# Patient Record
Sex: FEMALE | Race: WHITE | Employment: FULL TIME | URBAN - METROPOLITAN AREA
[De-identification: names, ages, dates, MRNs, and addresses within clinical notes are randomized per-mention and may not be internally consistent; named-entity substitution may affect disease eponyms.]

---

## 2017-08-02 ENCOUNTER — OFFICE VISIT (OUTPATIENT)
Dept: OBGYN CLINIC | Age: 52
End: 2017-08-02

## 2017-08-02 VITALS
SYSTOLIC BLOOD PRESSURE: 142 MMHG | HEIGHT: 67 IN | DIASTOLIC BLOOD PRESSURE: 94 MMHG | RESPIRATION RATE: 19 BRPM | HEART RATE: 75 BPM | BODY MASS INDEX: 29.35 KG/M2 | WEIGHT: 187 LBS

## 2017-08-02 DIAGNOSIS — Z11.51 SPECIAL SCREENING EXAMINATION FOR HUMAN PAPILLOMAVIRUS (HPV): ICD-10-CM

## 2017-08-02 DIAGNOSIS — Z01.419 WELL FEMALE EXAM WITH ROUTINE GYNECOLOGICAL EXAM: Primary | ICD-10-CM

## 2017-08-02 NOTE — PATIENT INSTRUCTIONS

## 2017-08-04 LAB
CYTOLOGIST CVX/VAG CYTO: NORMAL
CYTOLOGY CVX/VAG DOC THIN PREP: NORMAL
CYTOLOGY HISTORY:: NORMAL
DX ICD CODE: NORMAL
HPV I/H RISK 1 DNA CVX QL PROBE+SIG AMP: NEGATIVE
Lab: NORMAL
OTHER STN SPEC: NORMAL
PATH REPORT.FINAL DX SPEC: NORMAL
STAT OF ADQ CVX/VAG CYTO-IMP: NORMAL

## 2018-03-14 ENCOUNTER — TELEPHONE (OUTPATIENT)
Dept: OBGYN CLINIC | Age: 53
End: 2018-03-14

## 2018-03-14 DIAGNOSIS — Z12.39 SCREENING FOR BREAST CANCER: Primary | ICD-10-CM

## 2018-03-14 NOTE — TELEPHONE ENCOUNTER
Patient needed a mammogram order placed for the [facility] imagining dept since our mammogram machine is down. Orders placed and patient given central scheduling number.

## 2018-03-28 ENCOUNTER — OFFICE VISIT (OUTPATIENT)
Dept: OBGYN CLINIC | Age: 53
End: 2018-03-28

## 2018-03-28 ENCOUNTER — HOSPITAL ENCOUNTER (OUTPATIENT)
Dept: MAMMOGRAPHY | Age: 53
Discharge: HOME | End: 2018-03-28
Attending: OBSTETRICS & GYNECOLOGY
Payer: COMMERCIAL

## 2018-03-28 VITALS
WEIGHT: 191 LBS | BODY MASS INDEX: 29.98 KG/M2 | RESPIRATION RATE: 19 BRPM | HEIGHT: 67 IN | DIASTOLIC BLOOD PRESSURE: 87 MMHG | SYSTOLIC BLOOD PRESSURE: 137 MMHG | HEART RATE: 98 BPM

## 2018-03-28 DIAGNOSIS — Z01.419 WELL FEMALE EXAM WITH ROUTINE GYNECOLOGICAL EXAM: Primary | ICD-10-CM

## 2018-03-28 DIAGNOSIS — Z12.39 SCREENING BREAST EXAMINATION: ICD-10-CM

## 2018-03-28 PROCEDURE — 77063 BREAST TOMOSYNTHESIS BI: CPT

## 2018-03-28 NOTE — PROGRESS NOTES
Kelly Gross is a [de-identified] ,  50 y.o. female [facility] whose No LMP recorded. Patient has had a hysterectomy. was on  who presents for her annual checkup. She is having no significant problems. Hormone Status:    She is not having vasomotor symptoms. The patient is using no HRT. Sexual history:    She  reports that she currently engages in sexual activity. Medical conditions:    Since her last annual GYN exam about 6 months ago, she has had the following changes in her health history: none. Pap and Mammogram History:    Her most recent Pap smear was Negative and HPV negative obtained 6 months ago. The patient has a mammogram today. Breast Cancer History/Substance Abuse:    She has no family history of breast cancer. Osteoporosis History:    Family history does not include a first or second degree relative with osteopenia or osteoporosis. A bone density scan has not been previously obtained. Past Medical History:   Diagnosis Date    Hx of abnormal Pap smear 2012    HPV Positive     Past Surgical History:   Procedure Laterality Date    HX DILATION AND CURETTAGE      HX HYSTEROSCOPY      HX LAPAROSCOPIC SUPRACERVICAL HYSTERECTOMY  2010    HX MYOMECTOMY       Tobacco History:  reports that she has never smoked. She has never used smokeless tobacco.  Alcohol Abuse:  reports that she drinks alcohol. Drug Abuse:  reports that she does not use illicit drugs. Current Outpatient Prescriptions   Medication Sig Dispense Refill    naproxen sodium (ALEVE) 220 mg tablet Take 220 mg by mouth two (2) times daily (with meals). Allergies: Pcn [penicillins]   Social History     Social History    Marital status: SINGLE     Spouse name: N/A    Number of children: N/A    Years of education: N/A     Occupational History    Not on file.      Social History Main Topics    Smoking status: Never Smoker    Smokeless tobacco: Never Used    Alcohol use Yes      Comment: occ    Drug use: No    Sexual activity: Yes     Other Topics Concern    Not on file     Social History Narrative     There is no problem list on file for this patient.       Review of Systems - History obtained from the patient  Constitutional: negative for weight loss, fever, night sweats  HEENT: negative for hearing loss, earache, congestion, snoring, sorethroat  CV: negative for chest pain, palpitations, edema  Resp: negative for cough, shortness of breath, wheezing  GI: negative for change in bowel habits, abdominal pain, black or bloody stools  : negative for frequency, dysuria, hematuria, vaginal discharge  MSK: negative for back pain, joint pain, muscle pain  Breast: negative for breast lumps, nipple discharge, galactorrhea  Skin :negative for itching, rash, hives  Neuro: negative for dizziness, headache, confusion, weakness  Psych: negative for anxiety, depression, change in mood  Heme/lymph: negative for bleeding, bruising, pallor    Physical Exam    Visit Vitals    /87 (BP 1 Location: Left arm, BP Patient Position: Sitting)    Pulse 98    Resp 19    Ht 5' 7\" (1.702 m)    Wt 191 lb (86.6 kg)    BMI 29.91 kg/m2     Constitutional  · Appearance: well-nourished, well developed, alert, in no acute distress    HENT  · Head and Face: appears normal    Neck  · Inspection/Palpation: normal appearance, no masses or tenderness  Lymph Nodes: no lymphadenopathy present    Chest  · Respiratory Effort: breathing normal    Breasts  · Inspection of Breasts: breasts symmetrical, no skin changes, no discharge present, nipple appearance normal, no skin retraction present  · Palpation of Breasts and Axillae: no masses present on palpation, no breast tenderness  · Axillary Lymph Nodes: no lymphadenopathy present    Gastrointestinal  · Abdominal Examination: abdomen non-tender to palpation, normal bowel sounds, no masses present  · Liver and spleen: no hepatomegaly present, spleen not palpable  · Hernias: no hernias identified    Genitourinary  · External Genitalia: normal appearance for age, no discharge present, no tenderness present, no inflammatory lesions present, no masses present, no atrophy present  · Vagina: normal vaginal vault without central or paravaginal defects, no discharge present, no inflammatory lesions present, no masses present  · Bladder: non-tender to palpation  · Urethra: appears normal  · Cervix:normal  · Uterus: absent  · Adnexa: no adnexal tenderness present, no adnexal masses present  · Perineum: perineum within normal limits, no evidence of trauma, no rashes or skin lesions present  · Anus: anus within normal limits, no hemorrhoids present  · Inguinal Lymph Nodes: no lymphadenopathy present      Skin  · General Inspection: no rash, no lesions identified    Neurologic/Psychiatric  · Mental Status:  · Orientation: grossly oriented to person, place and time  · Mood and Affect: mood normal, affect appropriate    . Assessment:  Routine gynecologic examination  Her current medical status is satisfactory with no evidence of significant gynecologic issues.     Plan:  Counseled re: diet, exercise, healthy lifestyle  Return for yearly wellness visits  Rec annual mammogram  Patient Verbalized understanding

## 2018-07-16 ENCOUNTER — HOSPITAL ENCOUNTER (EMERGENCY)
Age: 53
Discharge: HOME | End: 2018-07-16
Attending: EMERGENCY MEDICINE | Admitting: EMERGENCY MEDICINE
Payer: COMMERCIAL

## 2018-07-16 ENCOUNTER — APPOINTMENT (OUTPATIENT)
Dept: CT IMAGING | Age: 53
End: 2018-07-16
Attending: PHYSICIAN ASSISTANT
Payer: COMMERCIAL

## 2018-07-16 VITALS
HEART RATE: 67 BPM | WEIGHT: 183 LBS | DIASTOLIC BLOOD PRESSURE: 89 MMHG | OXYGEN SATURATION: 97 % | HEIGHT: 67 IN | BODY MASS INDEX: 28.72 KG/M2 | RESPIRATION RATE: 18 BRPM | SYSTOLIC BLOOD PRESSURE: 132 MMHG | TEMPERATURE: 98.3 F

## 2018-07-16 DIAGNOSIS — R10.9 RIGHT FLANK PAIN: Primary | ICD-10-CM

## 2018-07-16 DIAGNOSIS — E86.0 DEHYDRATION: ICD-10-CM

## 2018-07-16 DIAGNOSIS — N12 PYELONEPHRITIS: ICD-10-CM

## 2018-07-16 LAB
ALBUMIN SERPL-MCNC: 3.6 G/DL (ref 3.5–5)
ALBUMIN/GLOB SERPL: 0.9 {RATIO} (ref 1.1–2.2)
ALP SERPL-CCNC: 100 U/L (ref 45–117)
ALT SERPL-CCNC: 35 U/L (ref 12–78)
ANION GAP SERPL CALC-SCNC: 11 MMOL/L (ref 5–15)
APPEARANCE UR: CLEAR
AST SERPL-CCNC: 26 U/L (ref 15–37)
BACTERIA URNS QL MICRO: ABNORMAL /HPF
BASOPHILS # BLD: 0.1 K/UL (ref 0–0.1)
BASOPHILS NFR BLD: 1 % (ref 0–1)
BILIRUB SERPL-MCNC: 0.4 MG/DL (ref 0.2–1)
BILIRUB UR QL: NEGATIVE
BUN SERPL-MCNC: 22 MG/DL (ref 6–20)
BUN/CREAT SERPL: 27 (ref 12–20)
CALCIUM SERPL-MCNC: 9.4 MG/DL (ref 8.5–10.1)
CHLORIDE SERPL-SCNC: 103 MMOL/L (ref 97–108)
CO2 SERPL-SCNC: 24 MMOL/L (ref 21–32)
COLOR UR: ABNORMAL
CREAT SERPL-MCNC: 0.81 MG/DL (ref 0.55–1.02)
DIFFERENTIAL METHOD BLD: NORMAL
EOSINOPHIL # BLD: 0.2 K/UL (ref 0–0.4)
EOSINOPHIL NFR BLD: 3 % (ref 0–7)
EPITH CASTS URNS QL MICRO: ABNORMAL /LPF
ERYTHROCYTE [DISTWIDTH] IN BLOOD BY AUTOMATED COUNT: 12.8 % (ref 11.5–14.5)
GLOBULIN SER CALC-MCNC: 4.1 G/DL (ref 2–4)
GLUCOSE SERPL-MCNC: 118 MG/DL (ref 65–100)
GLUCOSE UR STRIP.AUTO-MCNC: NEGATIVE MG/DL
HCT VFR BLD AUTO: 43.6 % (ref 35–47)
HGB BLD-MCNC: 14.6 G/DL (ref 11.5–16)
HGB UR QL STRIP: ABNORMAL
HYALINE CASTS URNS QL MICRO: ABNORMAL /LPF (ref 0–5)
IMM GRANULOCYTES # BLD: 0 K/UL (ref 0–0.04)
IMM GRANULOCYTES NFR BLD AUTO: 0 % (ref 0–0.5)
KETONES UR QL STRIP.AUTO: NEGATIVE MG/DL
LEUKOCYTE ESTERASE UR QL STRIP.AUTO: NEGATIVE
LYMPHOCYTES # BLD: 2.2 K/UL (ref 0.8–3.5)
LYMPHOCYTES NFR BLD: 27 % (ref 12–49)
MCH RBC QN AUTO: 29.9 PG (ref 26–34)
MCHC RBC AUTO-ENTMCNC: 33.5 G/DL (ref 30–36.5)
MCV RBC AUTO: 89.2 FL (ref 80–99)
MONOCYTES # BLD: 0.5 K/UL (ref 0–1)
MONOCYTES NFR BLD: 6 % (ref 5–13)
NEUTS SEG # BLD: 5.3 K/UL (ref 1.8–8)
NEUTS SEG NFR BLD: 64 % (ref 32–75)
NITRITE UR QL STRIP.AUTO: NEGATIVE
NRBC # BLD: 0 K/UL (ref 0–0.01)
NRBC BLD-RTO: 0 PER 100 WBC
PH UR STRIP: 5.5 [PH] (ref 5–8)
PLATELET # BLD AUTO: 330 K/UL (ref 150–400)
PMV BLD AUTO: 10.5 FL (ref 8.9–12.9)
POTASSIUM SERPL-SCNC: 4.1 MMOL/L (ref 3.5–5.1)
PROT SERPL-MCNC: 7.7 G/DL (ref 6.4–8.2)
PROT UR STRIP-MCNC: 100 MG/DL
RBC # BLD AUTO: 4.89 M/UL (ref 3.8–5.2)
RBC #/AREA URNS HPF: ABNORMAL /HPF (ref 0–5)
SODIUM SERPL-SCNC: 138 MMOL/L (ref 136–145)
SP GR UR REFRACTOMETRY: >1.03 (ref 1–1.03)
UR CULT HOLD, URHOLD: NORMAL
UROBILINOGEN UR QL STRIP.AUTO: 0.2 EU/DL (ref 0.2–1)
WBC # BLD AUTO: 8.3 K/UL (ref 3.6–11)
WBC URNS QL MICRO: ABNORMAL /HPF (ref 0–4)

## 2018-07-16 PROCEDURE — 99284 EMERGENCY DEPT VISIT MOD MDM: CPT

## 2018-07-16 PROCEDURE — 96375 TX/PRO/DX INJ NEW DRUG ADDON: CPT

## 2018-07-16 PROCEDURE — 96361 HYDRATE IV INFUSION ADD-ON: CPT

## 2018-07-16 PROCEDURE — 81001 URINALYSIS AUTO W/SCOPE: CPT | Performed by: EMERGENCY MEDICINE

## 2018-07-16 PROCEDURE — 96374 THER/PROPH/DIAG INJ IV PUSH: CPT

## 2018-07-16 PROCEDURE — 80053 COMPREHEN METABOLIC PANEL: CPT | Performed by: PHYSICIAN ASSISTANT

## 2018-07-16 PROCEDURE — 74176 CT ABD & PELVIS W/O CONTRAST: CPT

## 2018-07-16 PROCEDURE — 87086 URINE CULTURE/COLONY COUNT: CPT | Performed by: PHYSICIAN ASSISTANT

## 2018-07-16 PROCEDURE — 36415 COLL VENOUS BLD VENIPUNCTURE: CPT | Performed by: PHYSICIAN ASSISTANT

## 2018-07-16 PROCEDURE — 85025 COMPLETE CBC W/AUTO DIFF WBC: CPT | Performed by: PHYSICIAN ASSISTANT

## 2018-07-16 PROCEDURE — 74011250636 HC RX REV CODE- 250/636: Performed by: PHYSICIAN ASSISTANT

## 2018-07-16 RX ORDER — CEPHALEXIN 500 MG/1
500 CAPSULE ORAL 3 TIMES DAILY
Qty: 30 CAP | Refills: 0 | Status: SHIPPED | OUTPATIENT
Start: 2018-07-16 | End: 2018-07-26

## 2018-07-16 RX ORDER — ONDANSETRON 4 MG/1
4 TABLET, ORALLY DISINTEGRATING ORAL
Qty: 10 TAB | Refills: 0 | Status: SHIPPED | OUTPATIENT
Start: 2018-07-16 | End: 2019-03-05

## 2018-07-16 RX ORDER — ONDANSETRON 2 MG/ML
4 INJECTION INTRAMUSCULAR; INTRAVENOUS
Status: COMPLETED | OUTPATIENT
Start: 2018-07-16 | End: 2018-07-16

## 2018-07-16 RX ORDER — TRAMADOL HYDROCHLORIDE 50 MG/1
50 TABLET ORAL
Qty: 10 TAB | Refills: 0 | Status: SHIPPED | OUTPATIENT
Start: 2018-07-16 | End: 2019-03-05

## 2018-07-16 RX ORDER — KETOROLAC TROMETHAMINE 10 MG/1
10 TABLET, FILM COATED ORAL
Qty: 10 TAB | Refills: 0 | Status: SHIPPED | OUTPATIENT
Start: 2018-07-16 | End: 2019-03-05

## 2018-07-16 RX ORDER — KETOROLAC TROMETHAMINE 30 MG/ML
30 INJECTION, SOLUTION INTRAMUSCULAR; INTRAVENOUS
Status: COMPLETED | OUTPATIENT
Start: 2018-07-16 | End: 2018-07-16

## 2018-07-16 RX ADMIN — ONDANSETRON 4 MG: 2 INJECTION, SOLUTION INTRAMUSCULAR; INTRAVENOUS at 10:42

## 2018-07-16 RX ADMIN — SODIUM CHLORIDE 1000 ML: 900 INJECTION, SOLUTION INTRAVENOUS at 12:37

## 2018-07-16 RX ADMIN — KETOROLAC TROMETHAMINE 30 MG: 30 INJECTION, SOLUTION INTRAMUSCULAR; INTRAVENOUS at 10:44

## 2018-07-16 RX ADMIN — SODIUM CHLORIDE 1000 ML: 900 INJECTION, SOLUTION INTRAVENOUS at 10:40

## 2018-07-16 NOTE — ED PROVIDER NOTES
HPI Comments: Ivette Urias is a 50 y.o. female who presents ambulatory to the ED with a c/o right low back// flank pain since awakening yesterday am. Pt notes she has taken aleve yesterday without relief of her sx. She states she does not feel as though she is urinating as much as usual. She also reports constipation. Pt denies dysuria, hematuria, vomiting and diarrhea. She states she feels a little nauseated and thinks she has some right sided abd pressure. Pt denies f/c, cp,sob or blood in her stool. Pt denies injury. She denies a hx of back problems or kidney stones. She notes her sx feel different than her previous uti. Pt denies rash    PCP: Garo Raygoza MD  PMHx significant for: Past Medical History:  2012: Hx of abnormal Pap smear      Comment: HPV Positive  PSHx significant for: Past Surgical History:  No date: HX DILATION AND CURETTAGE  No date: HX HYSTEROSCOPY  2010: HX LAPAROSCOPIC SUPRACERVICAL HYSTERECTOMY  No date: HX MYOMECTOMY  Social Hx: Tobacco: denies  EtOH: rare Illicit drug use: denies     There are no further complaints or symptoms at this time. The history is provided by the patient. Past Medical History:   Diagnosis Date    Hx of abnormal Pap smear 2012    HPV Positive       Past Surgical History:   Procedure Laterality Date    HX DILATION AND CURETTAGE      HX HYSTEROSCOPY      HX LAPAROSCOPIC SUPRACERVICAL HYSTERECTOMY  2010    HX MYOMECTOMY           No family history on file. Social History     Social History    Marital status: SINGLE     Spouse name: N/A    Number of children: N/A    Years of education: N/A     Occupational History    Not on file.      Social History Main Topics    Smoking status: Never Smoker    Smokeless tobacco: Never Used    Alcohol use Yes      Comment: occ    Drug use: No    Sexual activity: Yes     Other Topics Concern    Not on file     Social History Narrative         ALLERGIES: Pcn [penicillins]    Review of Systems Constitutional: Negative for chills and fever. HENT: Negative for congestion, rhinorrhea, sneezing and sore throat. Eyes: Negative for redness and visual disturbance. Respiratory: Negative for shortness of breath. Cardiovascular: Negative for chest pain and leg swelling. Gastrointestinal: Positive for constipation and nausea. Negative for abdominal pain, diarrhea and vomiting. Genitourinary: Positive for decreased urine volume. Negative for difficulty urinating and frequency. Musculoskeletal: Positive for back pain. Negative for myalgias and neck stiffness. Skin: Negative for rash. Neurological: Negative for dizziness, syncope, weakness and headaches. Hematological: Negative for adenopathy. Patient Vitals for the past 12 hrs:   Temp Pulse Resp BP SpO2   07/16/18 1215 - - - 133/90 98 %   07/16/18 1145 - - - 102/73 97 %   07/16/18 1100 - - - 124/88 95 %   07/16/18 1003 98.3 °F (36.8 °C) 96 18 (!) 162/100 97 %              Physical Exam   Constitutional: She is oriented to person, place, and time. She appears well-developed and well-nourished. Moderate discomfort   HENT:   Head: Normocephalic and atraumatic. Right Ear: External ear normal.   Left Ear: External ear normal.   Nose: Nose normal.   Mouth/Throat: Oropharynx is clear and moist.   Eyes: EOM are normal. Pupils are equal, round, and reactive to light. Neck: Neck supple. No JVD present. No tracheal deviation present. Cardiovascular: Normal rate, regular rhythm, normal heart sounds and intact distal pulses. Exam reveals no gallop and no friction rub. No murmur heard. Pulmonary/Chest: Effort normal and breath sounds normal. No stridor. No respiratory distress. She has no wheezes. She has no rales. She exhibits no tenderness. Abdominal: Soft. Bowel sounds are normal. She exhibits no distension and no mass. There is no tenderness. There is no rebound and no guarding. Musculoskeletal: Normal range of motion.  She exhibits tenderness. She exhibits no edema or deformity. Back:Diffuse right flank and right low back pain. no other ttp to midline or throughout no swelling or step off. No discoloration. No deformity or lesions. Neg SLR neg EHL neg ARY. Ambulates without assistance. Distal n/v intact. Cap refill brisk   Lymphadenopathy:     She has no cervical adenopathy. Neurological: She is alert and oriented to person, place, and time. No cranial nerve deficit. Coordination normal.   Skin: No rash noted. No erythema. No pallor. Psychiatric: She has a normal mood and affect. Her behavior is normal.   Nursing note and vitals reviewed. MDM  Number of Diagnoses or Management Options     Amount and/or Complexity of Data Reviewed  Clinical lab tests: ordered and reviewed  Tests in the radiology section of CPT®: ordered and reviewed  Tests in the medicine section of CPT®: reviewed and ordered  Review and summarize past medical records: yes  Independent visualization of images, tracings, or specimens: yes    Patient Progress  Patient progress: stable        ED Course       Procedures      10:16 AM  Discussed pt, sx, hx and current findings with Ant Fonseca MD. He is in agreement with plan. Will get labs, urine and stone study. Pt declines pain meds other than zofran and toradol  Josph Maria M K. GHADA Mathews    12:13 PM   Updated pt on current findings suspicious for pyelo. Pt showing spec grav >1.030. Will give additional fluids, send urine for culture and discharge with abx and pain meds. Pt notes her pian is improved with meds  Elizabeth Ortiz. GHADA Mathews    .     1:45 PM   Pt feeling better. Will discharge to follow with pcp  Elizabeth Ortiz.  GHADA Mathews    LABORATORY TESTS:  Recent Results (from the past 12 hour(s))   URINALYSIS W/MICROSCOPIC    Collection Time: 07/16/18 10:30 AM   Result Value Ref Range    Color YELLOW/STRAW      Appearance CLEAR CLEAR      Specific gravity >1.030 (H) 1.003 - 1.030    pH (UA) 5.5 5.0 - 8.0      Protein 100 (A) NEG mg/dL    Glucose NEGATIVE  NEG mg/dL    Ketone NEGATIVE  NEG mg/dL    Bilirubin NEGATIVE  NEG      Blood TRACE (A) NEG      Urobilinogen 0.2 0.2 - 1.0 EU/dL    Nitrites NEGATIVE  NEG      Leukocyte Esterase NEGATIVE  NEG      WBC 0-4 0 - 4 /hpf    RBC 5-10 0 - 5 /hpf    Epithelial cells FEW FEW /lpf    Bacteria 1+ (A) NEG /hpf    Hyaline cast 2-5 0 - 5 /lpf   URINE CULTURE HOLD SAMPLE    Collection Time: 07/16/18 10:30 AM   Result Value Ref Range    Urine culture hold        URINE ON HOLD IN MICROBIOLOGY DEPT FOR 3 DAYS. IF UNPRESERVED URINE IS SUBMITTED, IT CANNOT BE USED FOR ADDITIONAL TESTING AFTER 24 HRS, RECOLLECTION WILL BE REQUIRED. CBC WITH AUTOMATED DIFF    Collection Time: 07/16/18 10:30 AM   Result Value Ref Range    WBC 8.3 3.6 - 11.0 K/uL    RBC 4.89 3.80 - 5.20 M/uL    HGB 14.6 11.5 - 16.0 g/dL    HCT 43.6 35.0 - 47.0 %    MCV 89.2 80.0 - 99.0 FL    MCH 29.9 26.0 - 34.0 PG    MCHC 33.5 30.0 - 36.5 g/dL    RDW 12.8 11.5 - 14.5 %    PLATELET 183 238 - 573 K/uL    MPV 10.5 8.9 - 12.9 FL    NRBC 0.0 0  WBC    ABSOLUTE NRBC 0.00 0.00 - 0.01 K/uL    NEUTROPHILS 64 32 - 75 %    LYMPHOCYTES 27 12 - 49 %    MONOCYTES 6 5 - 13 %    EOSINOPHILS 3 0 - 7 %    BASOPHILS 1 0 - 1 %    IMMATURE GRANULOCYTES 0 0.0 - 0.5 %    ABS. NEUTROPHILS 5.3 1.8 - 8.0 K/UL    ABS. LYMPHOCYTES 2.2 0.8 - 3.5 K/UL    ABS. MONOCYTES 0.5 0.0 - 1.0 K/UL    ABS. EOSINOPHILS 0.2 0.0 - 0.4 K/UL    ABS. BASOPHILS 0.1 0.0 - 0.1 K/UL    ABS. IMM.  GRANS. 0.0 0.00 - 0.04 K/UL    DF AUTOMATED     METABOLIC PANEL, COMPREHENSIVE    Collection Time: 07/16/18 10:30 AM   Result Value Ref Range    Sodium 138 136 - 145 mmol/L    Potassium 4.1 3.5 - 5.1 mmol/L    Chloride 103 97 - 108 mmol/L    CO2 24 21 - 32 mmol/L    Anion gap 11 5 - 15 mmol/L    Glucose 118 (H) 65 - 100 mg/dL    BUN 22 (H) 6 - 20 MG/DL    Creatinine 0.81 0.55 - 1.02 MG/DL    BUN/Creatinine ratio 27 (H) 12 - 20      GFR est AA >60 >60 ml/min/1.73m2    GFR est non-AA >60 >60 ml/min/1.73m2    Calcium 9.4 8.5 - 10.1 MG/DL    Bilirubin, total 0.4 0.2 - 1.0 MG/DL    ALT (SGPT) 35 12 - 78 U/L    AST (SGOT) 26 15 - 37 U/L    Alk. phosphatase 100 45 - 117 U/L    Protein, total 7.7 6.4 - 8.2 g/dL    Albumin 3.6 3.5 - 5.0 g/dL    Globulin 4.1 (H) 2.0 - 4.0 g/dL    A-G Ratio 0.9 (L) 1.1 - 2.2         IMAGING RESULTS:    Ct Abd Pelv Wo Cont    Result Date: 7/16/2018  EXAM:  CT ABD PELV WO CONT INDICATION: Right flank pain and urinary retention. COMPARISON: None. TECHNIQUE: Helical CT of the abdomen  and pelvis  without contrast. Coronal and sagittal reformats are performed. CT dose reduction was achieved through use of a standardized protocol tailored for this examination and automatic exposure control for dose modulation. FINDINGS: Solid organ evaluation is limited without contrast. The visualized lung bases demonstrate no mass or consolidation. The heart size is normal. There is no pericardial or pleural effusion. There is no renal, ureteral, or bladder calculus. The kidneys are symmetric without hydronephrosis. There is a small area of right perinephric fat stranding adjacent to the mid right kidney. A subcentimeter partially exophytic lesion at the lower right renal pole is too small to characterize. There is geographic low attenuation in the right liver. The spleen, pancreas, and adrenal glands are normal.  The gall bladder is present  without intra- or extra-hepatic biliary dilatation. There are no dilated bowel loops. The appendix is normal.  There are no enlarged lymph nodes. There is no free fluid or free air. The urinary bladder is minimally distended. Multiple pelvic phleboliths are noted. There is no pelvic mass. The bony structures are age-appropriate. IMPRESSION: 1. There is no  calculus or hydronephrosis. There is focal right perinephric fat stranding, which is nonspecific but raises the possibility of pyelonephritis. Correlate with urinalysis.  2. Geographic low-attenuation in the right liver suggesting hepatic steatosis. MEDICATIONS GIVEN:  Medications   sodium chloride 0.9 % bolus infusion 1,000 mL (0 mL IntraVENous IV Completed 7/16/18 1140)   ketorolac (TORADOL) injection 30 mg (30 mg IntraVENous Given 7/16/18 1044)   ondansetron (ZOFRAN) injection 4 mg (4 mg IntraVENous Given 7/16/18 1042)   sodium chloride 0.9 % bolus infusion 1,000 mL (1,000 mL IntraVENous New Bag 7/16/18 1237)       IMPRESSION:  1. Right flank pain    2. Pyelonephritis    3. Dehydration        PLAN:  1. Current Discharge Medication List      START taking these medications    Details   ketorolac (TORADOL) 10 mg tablet Take 1 Tab by mouth every eight (8) hours as needed for Pain. Qty: 10 Tab, Refills: 0    Associated Diagnoses: Right flank pain; Pyelonephritis      ondansetron (ZOFRAN ODT) 4 mg disintegrating tablet Take 1 Tab by mouth every eight (8) hours as needed for Nausea. Qty: 10 Tab, Refills: 0      cephALEXin (KEFLEX) 500 mg capsule Take 1 Cap by mouth three (3) times daily for 10 days. Qty: 30 Cap, Refills: 0      traMADol (ULTRAM) 50 mg tablet Take 1 Tab by mouth every six (6) hours as needed for Pain. Max Daily Amount: 200 mg. Indications: Pain  Qty: 10 Tab, Refills: 0    Associated Diagnoses: Right flank pain; Pyelonephritis         CONTINUE these medications which have NOT CHANGED    Details   naproxen sodium (ALEVE) 220 mg tablet Take 220 mg by mouth two (2) times daily (with meals). 2.   Follow-up Information     Follow up With Details Comments Contact Info    Daisy Edwards MD  2-4 days for recheck  [address] [facility]  [phone]          Return to ED if worse         1:45 PM  Pt has been reexamined. Pt has no new complaints, changes or physical findings. Care plan outlined and precautions discussed. All available results were reviewed with pt. All medications were reviewed with pt.  All of pt's questions and concerns were addressed. Pt agrees to F/U as instructed and agrees to return to ED upon further deterioration. Pt is ready to go home.   SAIGE Salazar

## 2018-07-16 NOTE — LETTER
[address] [place] [address] Dr 
[facility] EMERGENCY DEPT 
[address] [facility] [address] [id number] [phone] Work/School Note Date: 7/16/2018 To Whom It May concern: 
 
Yulia Tang was seen and treated today in the emergency room by the following provider(s): 
Attending Provider: Everett Sandoval MD 
Physician Assistant: SAIGE Monae. Yulia Tang may return to work on 7/18/18.  
 
Sincerely, 
 
 
 
 
SAIGE Monae

## 2018-07-16 NOTE — DISCHARGE INSTRUCTIONS
Dehydration: Care Instructions  Your Care Instructions  Dehydration happens when your body loses too much fluid. This might happen when you do not drink enough water or you lose large amounts of fluids from your body because of diarrhea, vomiting, or sweating. Severe dehydration can be life-threatening. Water and minerals called electrolytes help put your body fluids back in balance. Learn the early signs of fluid loss, and drink more fluids to prevent dehydration. Follow-up care is a key part of your treatment and safety. Be sure to make and go to all appointments, and call your doctor if you are having problems. It's also a good idea to know your test results and keep a list of the medicines you take. How can you care for yourself at home? · To prevent dehydration, drink plenty of fluids, enough so that your urine is light yellow or clear like water. Choose water and other caffeine-free clear liquids until you feel better. If you have kidney, heart, or liver disease and have to limit fluids, talk with your doctor before you increase the amount of fluids you drink. · If you do not feel like eating or drinking, try taking small sips of water, sports drinks, or other rehydration drinks. · Get plenty of rest.  To prevent dehydration  · Add more fluids to your diet and daily routine, unless your doctor has told you not to. · During hot weather, drink more fluids. Drink even more fluids if you exercise a lot. Stay away from drinks with alcohol or caffeine. · Watch for the symptoms of dehydration. These include:  ¨ A dry, sticky mouth. ¨ Dark yellow urine, and not much of it. ¨ Dry and sunken eyes. ¨ Feeling very tired. · Learn what problems can lead to dehydration. These include:  ¨ Diarrhea, fever, and vomiting. ¨ Any illness with a fever, such as pneumonia or the flu. ¨ Activities that cause heavy sweating, such as endurance races and heavy outdoor work in hot or humid weather.   ¨ Alcohol or drug abuse or withdrawal.  ¨ Certain medicines, such as cold and allergy pills (antihistamines), diet pills (diuretics), and laxatives. ¨ Certain diseases, such as diabetes, cancer, and heart or kidney disease. When should you call for help? Call 911 anytime you think you may need emergency care. For example, call if:    · You passed out (lost consciousness).    Call your doctor now or seek immediate medical care if:    · You are confused and cannot think clearly.     · You are dizzy or lightheaded, or you feel like you may faint.     · You have signs of needing more fluids. You have sunken eyes and a dry mouth, and you pass only a little dark urine.     · You cannot keep fluids down.    Watch closely for changes in your health, and be sure to contact your doctor if:    · You are not making tears.     · Your skin is very dry and sags slowly back into place after you pinch it.     · Your mouth and eyes are very dry. Where can you learn more? Go to http://ulices-allan.info/. Enter S044 in the search box to learn more about \"Dehydration: Care Instructions. \"  Current as of: November 20, 2017  Content Version: 11.7  © 7091-2055 [employer]. Care instructions adapted under license by [employer] (which disclaims liability or warranty for this information). If you have questions about a medical condition or this instruction, always ask your healthcare professional. [place] [address] any warranty or liability for your use of this information. Kidney Infection: Care Instructions  Your Care Instructions    A kidney infection (pyelonephritis) is a type of urinary tract infection, or UTI. Most UTIs are bladder infections. Kidney infections tend to make people much sicker than bladder infections do. A kidney infection is also more serious because it can cause lasting damage if it is not treated quickly.   Follow-up care is a key part of your treatment and safety. Be sure to make and go to all appointments, and call your doctor if you are having problems. It's also a good idea to know your test results and keep a list of the medicines you take. How can you care for yourself at home? · Take your antibiotics as directed. Do not stop taking them just because you feel better. You need to take the full course of antibiotics. · Drink plenty of water, enough so that your urine is light yellow or clear like water. This may help wash out bacteria that are causing the infection. If you have kidney, heart, or liver disease and have to limit fluids, talk with your doctor before you increase the amount of fluids you drink. · Urinate often. Try to empty your bladder each time. · To relieve pain, take a hot shower or lay a heating pad (set on low) over your lower belly. Never go to sleep with a heating pad in place. Put a thin cloth between the heating pad and your skin. To help prevent kidney infections  · Drink plenty of water each day. This helps you urinate often, which clears bacteria from your system. If you have kidney, heart, or liver disease and have to limit fluids, talk with your doctor before you increase the amount of fluids you drink. · Urinate when you have the urge. Do not hold your urine for a long time. Urinate before you go to sleep. · If you have symptoms of a bladder infection, such as burning when you urinate or having to urinate often, call your doctor so you can treat the problem before it gets worse. If you do not treat a bladder infection quickly, it can spread to the kidney. · Men should keep the tip of the penis clean. If you are a woman, keep these ideas in mind:  · Urinate right after you have sex. · Change sanitary pads often. Avoid douches, feminine hygiene sprays, and other feminine hygiene products that have deodorants. · After going to the bathroom, wipe from front to back. When should you call for help?   Call your doctor now or seek immediate medical care if:    · You have symptoms that a kidney infection is getting worse. These may include:  ¨ Pain or burning when you urinate. ¨ A frequent need to urinate without being able to pass much urine. ¨ Pain in the flank, which is just below the rib cage and above the waist on either side of the back. ¨ Blood in the urine. ¨ A fever.     · You are vomiting or nauseated.    Watch closely for changes in your health, and be sure to contact your doctor if:    · You do not get better as expected. Where can you learn more? Go to http://ulicesAccess Pointallan.info/. Enter I381 in the search box to learn more about \"Kidney Infection: Care Instructions. \"  Current as of: May 12, 2017  Content Version: 11.7  © 6662-8237 [employer]. Care instructions adapted under license by [employer] (which disclaims liability or warranty for this information). If you have questions about a medical condition or this instruction, always ask your healthcare professional. [place] [address] any warranty or liability for your use of this information. We hope that we have addressed all of your medical concerns. The examination and treatment you received in the Emergency Department were for an emergent problem and were not intended as complete care. It is important that you follow up with your healthcare provider(s) for ongoing care. If your symptoms worsen or do not improve as expected, and you are unable to reach your usual health care provider(s), you should return to the Emergency Department. Today's healthcare is undergoing tremendous change, and patient satisfaction surveys are one of the many tools to assess the quality of medical care. You may receive a survey from the [employer] organization regarding your experience in the Emergency Department. I hope that your experience has been completely positive, particularly the medical care that I provided. As such, please participate in the survey; anything less than excellent does not meet my expectations or intentions. [facility] [place] and [facility] participate in nationally recognized quality of care measures. If your blood pressure is greater than 120/80, as reported below, we urge that you seek medical care to address the potential of high blood pressure, commonly known as hypertension. Hypertension can be hereditary or can be caused by certain medical conditions, pain, stress, or \"white coat syndrome. \"       Please make an appointment with your health care provider(s) for follow up of your Emergency Department visit. VITALS:   Patient Vitals for the past 8 hrs:   Temp Pulse Resp BP SpO2   07/16/18 1215 - - - 133/90 98 %   07/16/18 1145 - - - 102/73 97 %   07/16/18 1100 - - - 124/88 95 %   07/16/18 1003 98.3 °F (36.8 °C) 96 18 (!) 162/100 97 %          Thank you for allowing us to provide you with medical care today. We realize that you have many choices for your emergency care needs. Please choose us in the future for any continued health care needs. Tigist Mathews, [address] [place] [address].   Office: [phone]            Recent Results (from the past 24 hour(s))   URINALYSIS W/MICROSCOPIC    Collection Time: 07/16/18 10:30 AM   Result Value Ref Range    Color YELLOW/STRAW      Appearance CLEAR CLEAR      Specific gravity >1.030 (H) 1.003 - 1.030    pH (UA) 5.5 5.0 - 8.0      Protein 100 (A) NEG mg/dL    Glucose NEGATIVE  NEG mg/dL    Ketone NEGATIVE  NEG mg/dL    Bilirubin NEGATIVE  NEG      Blood TRACE (A) NEG      Urobilinogen 0.2 0.2 - 1.0 EU/dL    Nitrites NEGATIVE  NEG      Leukocyte Esterase NEGATIVE  NEG      WBC 0-4 0 - 4 /hpf    RBC 5-10 0 - 5 /hpf    Epithelial cells FEW FEW /lpf    Bacteria 1+ (A) NEG /hpf    Hyaline cast 2-5 0 - 5 /lpf   URINE CULTURE HOLD SAMPLE    Collection Time: 07/16/18 10:30 AM Result Value Ref Range    Urine culture hold        URINE ON HOLD IN MICROBIOLOGY DEPT FOR 3 DAYS. IF UNPRESERVED URINE IS SUBMITTED, IT CANNOT BE USED FOR ADDITIONAL TESTING AFTER 24 HRS, RECOLLECTION WILL BE REQUIRED. CBC WITH AUTOMATED DIFF    Collection Time: 07/16/18 10:30 AM   Result Value Ref Range    WBC 8.3 3.6 - 11.0 K/uL    RBC 4.89 3.80 - 5.20 M/uL    HGB 14.6 11.5 - 16.0 g/dL    HCT 43.6 35.0 - 47.0 %    MCV 89.2 80.0 - 99.0 FL    MCH 29.9 26.0 - 34.0 PG    MCHC 33.5 30.0 - 36.5 g/dL    RDW 12.8 11.5 - 14.5 %    PLATELET 741 559 - 656 K/uL    MPV 10.5 8.9 - 12.9 FL    NRBC 0.0 0  WBC    ABSOLUTE NRBC 0.00 0.00 - 0.01 K/uL    NEUTROPHILS 64 32 - 75 %    LYMPHOCYTES 27 12 - 49 %    MONOCYTES 6 5 - 13 %    EOSINOPHILS 3 0 - 7 %    BASOPHILS 1 0 - 1 %    IMMATURE GRANULOCYTES 0 0.0 - 0.5 %    ABS. NEUTROPHILS 5.3 1.8 - 8.0 K/UL    ABS. LYMPHOCYTES 2.2 0.8 - 3.5 K/UL    ABS. MONOCYTES 0.5 0.0 - 1.0 K/UL    ABS. EOSINOPHILS 0.2 0.0 - 0.4 K/UL    ABS. BASOPHILS 0.1 0.0 - 0.1 K/UL    ABS. IMM. GRANS. 0.0 0.00 - 0.04 K/UL    DF AUTOMATED     METABOLIC PANEL, COMPREHENSIVE    Collection Time: 07/16/18 10:30 AM   Result Value Ref Range    Sodium 138 136 - 145 mmol/L    Potassium 4.1 3.5 - 5.1 mmol/L    Chloride 103 97 - 108 mmol/L    CO2 24 21 - 32 mmol/L    Anion gap 11 5 - 15 mmol/L    Glucose 118 (H) 65 - 100 mg/dL    BUN 22 (H) 6 - 20 MG/DL    Creatinine 0.81 0.55 - 1.02 MG/DL    BUN/Creatinine ratio 27 (H) 12 - 20      GFR est AA >60 >60 ml/min/1.73m2    GFR est non-AA >60 >60 ml/min/1.73m2    Calcium 9.4 8.5 - 10.1 MG/DL    Bilirubin, total 0.4 0.2 - 1.0 MG/DL    ALT (SGPT) 35 12 - 78 U/L    AST (SGOT) 26 15 - 37 U/L    Alk.  phosphatase 100 45 - 117 U/L    Protein, total 7.7 6.4 - 8.2 g/dL    Albumin 3.6 3.5 - 5.0 g/dL    Globulin 4.1 (H) 2.0 - 4.0 g/dL    A-G Ratio 0.9 (L) 1.1 - 2.2         Ct Abd Pelv Wo Cont    Result Date: 7/16/2018  EXAM:  CT ABD PELV WO CONT INDICATION: Right flank pain and urinary retention. COMPARISON: None. TECHNIQUE: Helical CT of the abdomen  and pelvis  without contrast. Coronal and sagittal reformats are performed. CT dose reduction was achieved through use of a standardized protocol tailored for this examination and automatic exposure control for dose modulation. FINDINGS: Solid organ evaluation is limited without contrast. The visualized lung bases demonstrate no mass or consolidation. The heart size is normal. There is no pericardial or pleural effusion. There is no renal, ureteral, or bladder calculus. The kidneys are symmetric without hydronephrosis. There is a small area of right perinephric fat stranding adjacent to the mid right kidney. A subcentimeter partially exophytic lesion at the lower right renal pole is too small to characterize. There is geographic low attenuation in the right liver. The spleen, pancreas, and adrenal glands are normal.  The gall bladder is present  without intra- or extra-hepatic biliary dilatation. There are no dilated bowel loops. The appendix is normal.  There are no enlarged lymph nodes. There is no free fluid or free air. The urinary bladder is minimally distended. Multiple pelvic phleboliths are noted. There is no pelvic mass. The bony structures are age-appropriate. IMPRESSION: 1. There is no  calculus or hydronephrosis. There is focal right perinephric fat stranding, which is nonspecific but raises the possibility of pyelonephritis. Correlate with urinalysis. 2. Geographic low-attenuation in the right liver suggesting hepatic steatosis. Flank Pain: Care Instructions  Your Care Instructions  Flank pain is pain on the side of the back just below the rib cage and above the waist. It can be on one or both sides. Flank pain has many possible causes, including a kidney stone, a urinary tract infection, or back strain. Flank pain may get better on its own.  But don't ignore new symptoms, such as fever, nausea and vomiting, urination problems, pain that gets worse, and dizziness. These may be signs of a more serious problem. You may have to have tests or other treatment. Follow-up care is a key part of your treatment and safety. Be sure to make and go to all appointments, and call your doctor if you are having problems. It's also a good idea to know your test results and keep a list of the medicines you take. How can you care for yourself at home? · Rest until you feel better. · Take pain medicines exactly as directed. ¨ If the doctor gave you a prescription medicine for pain, take it as prescribed. ¨ If you are not taking a prescription pain medicine, ask your doctor if you can take an over-the-counter pain medicine, such as acetaminophen (Tylenol), ibuprofen (Advil, Motrin), or naproxen (Aleve). Read and follow all instructions on the label. · If your doctor prescribed antibiotics, take them as directed. Do not stop taking them just because you feel better. You need to take the full course of antibiotics. · To apply heat, put a warm water bottle, a heating pad set on low, or a warm cloth on the painful area. Do not go to sleep with a heating pad on your skin. · To prevent dehydration, drink plenty of fluids, enough so that your urine is light yellow or clear like water. Choose water and other caffeine-free clear liquids until you feel better. If you have kidney, heart, or liver disease and have to limit fluids, talk with your doctor before you increase the amount of fluids you drink. When should you call for help? Call your doctor now or seek immediate medical care if:    · Your flank pain gets worse.     · You have new symptoms, such as fever, nausea, or vomiting.     · You have symptoms of a urinary problem. For example:  ¨ You have blood or pus in your urine. ¨ You have chills or body aches. ¨ It hurts to urinate.   ¨ You have groin or belly pain.    Watch closely for changes in your health, and be sure to contact your doctor if you do not get better as expected. Where can you learn more? Go to http://ulices-allan.info/. Enter S191 in the search box to learn more about \"Flank Pain: Care Instructions. \"  Current as of: November 20, 2017  Content Version: 11.7  © 0317-3317 [employer]. Care instructions adapted under license by [employer] (which disclaims liability or warranty for this information). If you have questions about a medical condition or this instruction, always ask your healthcare professional. [address] any warranty or liability for your use of this information.

## 2018-07-16 NOTE — ED TRIAGE NOTES
Pt arrives ambulatory from home with CC of left lower back pain that started yesterday AM when she woke up. Pt appears in moderate distress, unable to sit in triage, though she drove herself. Pt reports no known injury and no prior PMH of back pain. Has taken Aleve without relief. Also reports possible urinary retention for the past 3 days; states that she has decreased output compared to usual but is not experiencing any dysuria.

## 2018-07-18 LAB
BACTERIA SPEC CULT: NORMAL
CC UR VC: NORMAL
SERVICE CMNT-IMP: NORMAL

## 2018-10-25 ENCOUNTER — OFFICE VISIT (OUTPATIENT)
Dept: OBGYN CLINIC | Age: 53
End: 2018-10-25

## 2018-10-25 VITALS
DIASTOLIC BLOOD PRESSURE: 92 MMHG | WEIGHT: 194.5 LBS | HEIGHT: 67 IN | SYSTOLIC BLOOD PRESSURE: 142 MMHG | BODY MASS INDEX: 30.53 KG/M2

## 2018-10-25 DIAGNOSIS — Z11.3 SCREEN FOR STD (SEXUALLY TRANSMITTED DISEASE): ICD-10-CM

## 2018-10-25 DIAGNOSIS — L73.2 HIDRADENITIS: Primary | ICD-10-CM

## 2018-10-25 RX ORDER — CLINDAMYCIN PHOSPHATE 10 UG/ML
LOTION TOPICAL
Refills: 0 | COMMUNITY
Start: 2018-09-18

## 2018-10-25 RX ORDER — ALBUTEROL SULFATE 90 UG/1
AEROSOL, METERED RESPIRATORY (INHALATION)
Refills: 1 | COMMUNITY
Start: 2018-10-06 | End: 2019-03-05

## 2018-10-25 NOTE — PROGRESS NOTES
Vulvar lesion evaluation    Arnol Marion is a 50 y.o. female  No LMP recorded. Patient has had a hysterectomy. who presents with pustules on her labia/vulva. She noticed it 2 months ago. The lesion has shown the following change: pain and drainage. No new lesions have developed. She would also like STD testing. Has been treated for this by derm with Cleocin lotion, which she has not been using. Was given dx of Hidradenitis by derm. She reports the following associated symptoms: none. She denies the following associated symptoms: itching, pain, redness, drainage, swelling, irritation. Aggravating factors: none. Alleviating factors: none. Past Medical History:   Diagnosis Date    Hx of abnormal Pap smear 2012    HPV Positive     Past Surgical History:   Procedure Laterality Date    HX DILATION AND CURETTAGE      HX HYSTEROSCOPY      HX LAPAROSCOPIC SUPRACERVICAL HYSTERECTOMY  2010    HX MYOMECTOMY       Social History     Occupational History    Not on file   Tobacco Use    Smoking status: Never Smoker    Smokeless tobacco: Never Used   Substance and Sexual Activity    Alcohol use: Yes     Comment: occ    Drug use: No    Sexual activity: Yes     No family history on file. Allergies   Allergen Reactions    Penicillins Other (comments)     Syncope    Other reaction(s): Other (See Comments)     Prior to Admission medications    Medication Sig Start Date End Date Taking? Authorizing Provider   PROAIR HFA 90 mcg/actuation inhaler INHALE 2 PUFFS 4 TIMES A DAY WITH SPACER 10/6/18  Yes Provider, Historical   clindamycin (CLEOCIN T) 1 % lotion APPLY TO AFFECTED AREA TWICE A DAY 9/18/18  Yes Provider, Historical   traMADol (ULTRAM) 50 mg tablet Take 1 Tab by mouth every six (6) hours as needed for Pain. Max Daily Amount: 200 mg. Indications: Pain 7/16/18  Yes Nhan Mathews PA   naproxen sodium (ALEVE) 220 mg tablet Take 220 mg by mouth two (2) times daily (with meals).    Yes Provider, Historical   ketorolac (TORADOL) 10 mg tablet Take 1 Tab by mouth every eight (8) hours as needed for Pain. 7/16/18   Belén Mathews PA   ondansetron (ZOFRAN ODT) 4 mg disintegrating tablet Take 1 Tab by mouth every eight (8) hours as needed for Nausea.  7/16/18   SAIGE Alfonso        Review of Systems: History obtained from the patient  Constitutional: negative for weight loss, fever, night sweats  GI: negative for change in bowel habits, abdominal pain, black or bloody stools  : negative for frequency, dysuria, hematuria, vaginal discharge  MSK: negative for back pain, joint pain, muscle pain  Skin: negative for itching, rash, hives elsewhere  Neuro: negative for dizziness, headache, confusion, weakness  Psych: negative for anxiety, depression, change in mood  Heme/lymph: negative for bleeding, bruising, pallor    Objective:  Visit Vitals  BP (!) 142/92   Ht 5' 7\" (1.702 m)   Wt 194 lb 8 oz (88.2 kg)   BMI 30.46 kg/m²       Physical Exam:   PHYSICAL EXAMINATION    Constitutional  · Appearance: well-nourished, well developed, alert, in no acute distress    Gastrointestinal  · Abdominal Examination: abdomen non-tender to palpation, normal bowel sounds, no masses present  · Liver and spleen: no hepatomegaly present, spleen not palpable  · Hernias: no hernias identified    Genitourinary  · External Genitalia: two small draining inflammatory cysts--3 mm, no discharge present, no tenderness present, no other inflammatory lesions present, no masses present, no atrophy present  · Vagina: normal vaginal vault without central or paravaginal defects, no discharge present, no inflammatory lesions present, no masses present  · Bladder: non-tender to palpation  · Urethra: appears normal  · Cervix: normal   · Uterus: normal size, shape and consistency  · Adnexa: no adnexal tenderness present, no adnexal masses present  · Perineum: perineum within normal limits, no evidence of trauma, no rashes or skin lesions present  · Anus: anus within normal limits, no hemorrhoids present  · Inguinal Lymph Nodes: no lymphadenopathy present    Skin  · General Inspection: no rash, no lesions identified    Neurologic/Psychiatric  · Mental Status:  · Orientation: grossly oriented to person, place and time  · Mood and Affect: mood normal, affect appropriate          Assessment:   Hidradenitis lesions    Plan:   Advised to use Cleocin lotion, cortisone cream, and dial antibacterial soap on her vulva. RTO prn if symptoms persist or worsen. Instructions given to pt. Handouts given to pt.

## 2018-10-30 ENCOUNTER — TELEPHONE (OUTPATIENT)
Dept: OBGYN CLINIC | Age: 53
End: 2018-10-30

## 2018-10-30 LAB
C TRACH RRNA VAG QL NAA+PROBE: NEGATIVE
N GONORRHOEA RRNA VAG QL NAA+PROBE: NEGATIVE
T VAGINALIS RRNA VAG QL NAA+PROBE: NEGATIVE

## 2018-10-31 NOTE — TELEPHONE ENCOUNTER
They are on the chart and negative. Feel free to give results that are negative without asking me.   Thanks

## 2019-03-05 ENCOUNTER — OFFICE VISIT (OUTPATIENT)
Dept: OBGYN CLINIC | Age: 54
End: 2019-03-05

## 2019-03-05 VITALS
SYSTOLIC BLOOD PRESSURE: 116 MMHG | HEIGHT: 67 IN | BODY MASS INDEX: 30.32 KG/M2 | DIASTOLIC BLOOD PRESSURE: 77 MMHG | HEART RATE: 73 BPM | WEIGHT: 193.2 LBS

## 2019-03-05 DIAGNOSIS — Z01.419 WELL FEMALE EXAM WITH ROUTINE GYNECOLOGICAL EXAM: Primary | ICD-10-CM

## 2019-03-05 RX ORDER — LISINOPRIL 10 MG/1
TABLET ORAL
COMMUNITY
Start: 2019-01-03

## 2019-03-05 NOTE — PROGRESS NOTES
Jhonny Muniz is a [de-identified] P5,  48 y.o. female [facility] whose No LMP recorded. Patient has had a hysterectomy. was on  who presents for her annual checkup. She is having no significant problems. Hormone Status:    She is not having vasomotor symptoms. The patient is using HRT: none    Sexual history:    She  reports that she currently engages in sexual activity. Medical conditions:    Since her last annual GYN exam about one year ago, she has had the following changes in her health history: none. Pap and Mammogram History:    Her most recent Pap smear was Negative and HPV negative obtained 2 year(s) ago. The patient mammo planned for today. Breast Cancer History/Substance Abuse:    She has no family history of breast cancer. Osteoporosis History:    Family history does not include a first or second degree relative with osteopenia or osteoporosis. A bone density scan has not been previously obtained. Past Medical History:   Diagnosis Date    Hx of abnormal Pap smear 2012    HPV Positive     Past Surgical History:   Procedure Laterality Date    HX DILATION AND CURETTAGE      HX HYSTEROSCOPY      HX LAPAROSCOPIC SUPRACERVICAL HYSTERECTOMY  2010    HX MYOMECTOMY       Tobacco History:  reports that  has never smoked. she has never used smokeless tobacco.  Alcohol Abuse:  reports that she drinks alcohol. Drug Abuse:  reports that she does not use drugs. Current Outpatient Medications   Medication Sig Dispense Refill    lisinopril (PRINIVIL, ZESTRIL) 10 mg tablet       clindamycin (CLEOCIN T) 1 % lotion APPLY TO AFFECTED AREA TWICE A DAY  0    naproxen sodium (ALEVE) 220 mg tablet Take 220 mg by mouth two (2) times daily (with meals).  PROAIR HFA 90 mcg/actuation inhaler INHALE 2 PUFFS 4 TIMES A DAY WITH SPACER  1    ketorolac (TORADOL) 10 mg tablet Take 1 Tab by mouth every eight (8) hours as needed for Pain.  10 Tab 0    ondansetron (ZOFRAN ODT) 4 mg disintegrating tablet Take 1 Tab by mouth every eight (8) hours as needed for Nausea. 10 Tab 0    traMADol (ULTRAM) 50 mg tablet Take 1 Tab by mouth every six (6) hours as needed for Pain. Max Daily Amount: 200 mg. Indications: Pain 10 Tab 0     Allergies: Penicillins   Social History     Socioeconomic History    Marital status: SINGLE     Spouse name: Not on file    Number of children: Not on file    Years of education: Not on file    Highest education level: Not on file   Social Needs    Financial resource strain: Not on file    Food insecurity - worry: Not on file    Food insecurity - inability: Not on file    Transportation needs - medical: Not on file   [place] needs - non-medical: Not on file   Occupational History    Not on file   Tobacco Use    Smoking status: Never Smoker    Smokeless tobacco: Never Used   Substance and Sexual Activity    Alcohol use: Yes     Comment: occ    Drug use: No    Sexual activity: Yes   Other Topics Concern    Not on file   Social History Narrative    Not on file     There is no problem list on file for this patient.       Review of Systems - History obtained from the patient  Constitutional: negative for weight loss, fever, night sweats  HEENT: negative for hearing loss, earache, congestion, snoring, sorethroat  CV: negative for chest pain, palpitations, edema  Resp: negative for cough, shortness of breath, wheezing  GI: negative for change in bowel habits, abdominal pain, black or bloody stools  : negative for frequency, dysuria, hematuria, vaginal discharge  MSK: negative for back pain, joint pain, muscle pain  Breast: negative for breast lumps, nipple discharge, galactorrhea  Skin :negative for itching, rash, hives  Neuro: negative for dizziness, headache, confusion, weakness  Psych: negative for anxiety, depression, change in mood  Heme/lymph: negative for bleeding, bruising, pallor    Physical Exam    Visit Vitals  /77 (BP 1 Location: Left arm, BP Patient Position: Sitting)   Pulse 73   Ht 5' 7\" (1.702 m)   Wt 193 lb 3.2 oz (87.6 kg)   BMI 30.26 kg/m²     Constitutional  · Appearance: well-nourished, well developed, alert, in no acute distress    HENT  · Head and Face: appears normal    Neck  · Inspection/Palpation: normal appearance, no masses or tenderness  Lymph Nodes: no lymphadenopathy present    Chest  · Respiratory Effort: breathing normal    Breasts  · Inspection of Breasts: breasts symmetrical, no skin changes, no discharge present, nipple appearance normal, no skin retraction present  · Palpation of Breasts and Axillae: no masses present on palpation, no breast tenderness  · Axillary Lymph Nodes: no lymphadenopathy present    Gastrointestinal  · Abdominal Examination: abdomen non-tender to palpation, normal bowel sounds, no masses present  · Liver and spleen: no hepatomegaly present, spleen not palpable  · Hernias: no hernias identified    Genitourinary  · External Genitalia: normal appearance for age, no discharge present, no tenderness present, no inflammatory lesions present, no masses present, no atrophy present  · Vagina: normal vaginal vault without central or paravaginal defects, no discharge present, no inflammatory lesions present, no masses present  · Bladder: non-tender to palpation  · Urethra: appears normal  · Cervix: normal  · Uterus: absent  · Adnexa: no adnexal tenderness present, no adnexal masses present  · Perineum: perineum within normal limits, no evidence of trauma, no rashes or skin lesions present  · Anus: anus within normal limits, no hemorrhoids present  · Inguinal Lymph Nodes: no lymphadenopathy present      Skin  · General Inspection: no rash, no lesions identified    Neurologic/Psychiatric  · Mental Status:  · Orientation: grossly oriented to person, place and time  · Mood and Affect: mood normal, affect appropriate    .   Assessment:  Routine gynecologic examination  Her current medical status is satisfactory with no evidence of significant gynecologic issues.     Plan:  Counseled re: diet, exercise, healthy lifestyle  Return for yearly wellness visits  Rec annual mammogram  Patient Verbalized understanding

## 2020-05-22 NOTE — PROGRESS NOTES
Martha Ortez is a [de-identified] P5,  48 y.o. female [facility] whose No LMP recorded. Patient has had a hysterectomy.(LSH)  was on  who presents for her annual checkup. She is having no significant problems. Hormone Status:    She is not having vasomotor symptoms. The patient using HRT: no    Sexual history:    She  reports being sexually active. Medical conditions:    Since her last annual GYN exam about one year ago, she has had the following changes in her health history: none. Pap and Mammogram History:    Her most recent Pap smear was normal obtained 3 year(s) ago. The patient had her mammogram today in our office. Breast Cancer History/Substance Abuse:    She has no and a family history of breast cancer. Osteoporosis History:    Family history does not include a first or second degree relative with osteopenia or osteoporosis. A bone density scan has not been previously obtained. Past Medical History:   Diagnosis Date    Hx of abnormal Pap smear 2012    HPV Positive     Past Surgical History:   Procedure Laterality Date    HX DILATION AND CURETTAGE      HX HYSTEROSCOPY      HX LAPAROSCOPIC SUPRACERVICAL HYSTERECTOMY  2010    HX MYOMECTOMY       Tobacco History:  reports that she has never smoked. She has never used smokeless tobacco.  Alcohol Abuse:  reports current alcohol use. Drug Abuse:  reports no history of drug use. Current Outpatient Medications   Medication Sig Dispense Refill    atorvastatin (LIPITOR) 20 mg tablet TAKE 1 TABLET BY MOUTH EVERYDAY AT BEDTIME      naproxen (NAPROSYN) 500 mg tablet TAKE 1 TABLET BY MOUTH TWICE A DAY      triamcinolone acetonide (KENALOG) 0.1 % ointment       valsartan (DIOVAN) 80 mg tablet       lisinopril (PRINIVIL, ZESTRIL) 10 mg tablet       clindamycin (CLEOCIN T) 1 % lotion APPLY TO AFFECTED AREA TWICE A DAY  0    naproxen sodium (ALEVE) 220 mg tablet Take 220 mg by mouth two (2) times daily (with meals). Allergies: Penicillins   Social History     Socioeconomic History    Marital status: SINGLE     Spouse name: Not on file    Number of children: Not on file    Years of education: Not on file    Highest education level: Not on file   Occupational History    Not on file   Social Needs    Financial resource strain: Not on file    Food insecurity     Worry: Not on file     Inability: Not on file    Transportation needs     Medical: Not on file     Non-medical: Not on file   Tobacco Use    Smoking status: Never Smoker    Smokeless tobacco: Never Used   Substance and Sexual Activity    Alcohol use: Yes     Comment: occ    Drug use: No    Sexual activity: Yes   Lifestyle    Physical activity     Days per week: Not on file     Minutes per session: Not on file    Stress: Not on file   Relationships    Social connections     Talks on phone: Not on file     Gets together: Not on file     Attends Hoahaoism service: Not on file     Active member of club or organization: Not on file     Attends meetings of clubs or organizations: Not on file     Relationship status: Not on file    Intimate partner violence     Fear of current or ex partner: Not on file     Emotionally abused: Not on file     Physically abused: Not on file     Forced sexual activity: Not on file   Other Topics Concern    Not on file   Social History Narrative    Not on file     There is no problem list on file for this patient.       Review of Systems - History obtained from the patient  Constitutional: negative for weight loss, fever, night sweats  HEENT: negative for hearing loss, earache, congestion, snoring, sorethroat  CV: negative for chest pain, palpitations, edema  Resp: negative for cough, shortness of breath, wheezing  GI: negative for change in bowel habits, abdominal pain, black or bloody stools  : negative for frequency, dysuria, hematuria, vaginal discharge  MSK: negative for back pain, joint pain, muscle pain  Breast: negative for breast lumps, nipple discharge, galactorrhea  Skin :negative for itching, rash, hives  Neuro: negative for dizziness, headache, confusion, weakness  Psych: negative for anxiety, depression, change in mood  Heme/lymph: negative for bleeding, bruising, pallor    Physical Exam    Visit Vitals  /78   Pulse 86   Ht 5' 7\" (1.702 m)   Wt 197 lb (89.4 kg)   BMI 30.85 kg/m²     Constitutional  · Appearance: well-nourished, well developed, alert, in no acute distress    HENT  · Head and Face: appears normal    Neck  · Inspection/Palpation: normal appearance, no masses or tenderness  Lymph Nodes: no lymphadenopathy present    Chest  · Respiratory Effort: breathing normal    Breasts  · Inspection of Breasts: breasts symmetrical, no skin changes, no discharge present, nipple appearance normal, no skin retraction present  · Palpation of Breasts and Axillae: no masses present on palpation, no breast tenderness  · Axillary Lymph Nodes: no lymphadenopathy present    Gastrointestinal  · Abdominal Examination: abdomen non-tender to palpation, normal bowel sounds, no masses present  · Liver and spleen: no hepatomegaly present, spleen not palpable  · Hernias: no hernias identified    Genitourinary  · External Genitalia: normal appearance for age, no discharge present, no tenderness present, no inflammatory lesions present, no masses present, no atrophy present  · Vagina: normal vaginal vault without central or paravaginal defects, no discharge present, no inflammatory lesions present, no masses present  · Bladder: non-tender to palpation  · Urethra: appears normal  · Cervix: normal  · Uterus: absent  · Adnexa: no adnexal tenderness present, no adnexal masses present  · Perineum: perineum within normal limits, no evidence of trauma, no rashes or skin lesions present  · Anus: anus within normal limits, no hemorrhoids present  · Inguinal Lymph Nodes: no lymphadenopathy present      Skin  · General Inspection: no rash, no lesions identified    Neurologic/Psychiatric  · Mental Status:  · Orientation: grossly oriented to person, place and time  · Mood and Affect: mood normal, affect appropriate    . Assessment:  Routine gynecologic examination  Her current medical status is satisfactory with no evidence of significant gynecologic issues.     Plan:  Counseled re: diet, exercise, healthy lifestyle  Return for yearly wellness visits  Rec annual mammogram  Patient Verbalized understanding

## 2020-05-26 ENCOUNTER — OFFICE VISIT (OUTPATIENT)
Dept: OBGYN CLINIC | Age: 55
End: 2020-05-26

## 2020-05-26 VITALS
HEART RATE: 86 BPM | WEIGHT: 197 LBS | DIASTOLIC BLOOD PRESSURE: 78 MMHG | SYSTOLIC BLOOD PRESSURE: 108 MMHG | BODY MASS INDEX: 30.92 KG/M2 | HEIGHT: 67 IN

## 2020-05-26 DIAGNOSIS — Z01.419 ENCOUNTER FOR WELL WOMAN EXAM WITH ROUTINE GYNECOLOGICAL EXAM: Primary | ICD-10-CM

## 2020-05-26 DIAGNOSIS — Z01.419 WELL WOMAN EXAM WITH ROUTINE GYNECOLOGICAL EXAM: ICD-10-CM

## 2020-05-26 RX ORDER — ATORVASTATIN CALCIUM 20 MG/1
TABLET, FILM COATED ORAL
COMMUNITY
Start: 2020-05-18

## 2020-05-26 RX ORDER — TRIAMCINOLONE ACETONIDE 1 MG/G
OINTMENT TOPICAL
COMMUNITY
Start: 2020-04-23

## 2020-05-26 RX ORDER — NAPROXEN 500 MG/1
TABLET ORAL
COMMUNITY
Start: 2020-04-15

## 2020-05-26 RX ORDER — VALSARTAN 80 MG/1
TABLET ORAL
COMMUNITY
Start: 2020-04-07

## 2020-06-04 LAB
CYTOLOGIST CVX/VAG CYTO: NORMAL
CYTOLOGY CVX/VAG DOC CYTO: NORMAL
CYTOLOGY CVX/VAG DOC THIN PREP: NORMAL
CYTOLOGY HISTORY:: NORMAL
DX ICD CODE: NORMAL
HPV I/H RISK 1 DNA CVX QL PROBE+SIG AMP: NEGATIVE
Lab: NORMAL
OTHER STN SPEC: NORMAL
STAT OF ADQ CVX/VAG CYTO-IMP: NORMAL

## 2021-04-27 ENCOUNTER — OFFICE VISIT (OUTPATIENT)
Dept: OBGYN CLINIC | Age: 56
End: 2021-04-27

## 2021-04-27 VITALS — DIASTOLIC BLOOD PRESSURE: 74 MMHG | BODY MASS INDEX: 31.48 KG/M2 | SYSTOLIC BLOOD PRESSURE: 118 MMHG | WEIGHT: 201 LBS

## 2021-04-27 DIAGNOSIS — Z01.419 WELL WOMAN EXAM WITH ROUTINE GYNECOLOGICAL EXAM: Primary | ICD-10-CM

## 2021-04-27 PROCEDURE — 99396 PREV VISIT EST AGE 40-64: CPT | Performed by: OBSTETRICS & GYNECOLOGY

## 2021-04-27 RX ORDER — METRONIDAZOLE 500 MG/1
500 TABLET ORAL 2 TIMES DAILY
Qty: 14 TAB | Refills: 0 | Status: SHIPPED | OUTPATIENT
Start: 2021-04-27 | End: 2021-05-04

## 2021-04-27 NOTE — PROGRESS NOTES
Max Sanabria is a [de-identified] P5,  50 y.o. female WHITE whose No LMP recorded. Patient has had a hysterectomy. was on  who presents for her annual checkup. She is having no significant problems. Hormone Status:    She is not having vasomotor symptoms. The patient is using HRT: none    Sexual history:    She  reports being sexually active. Medical conditions:    Since her last annual GYN exam about one year ago, she has had the following changes in her health history: none. Pap and Mammogram History:    Her most recent Pap smear was normal obtained 1 year(s) ago. The patient had her mammogram today in our office. Breast Cancer History/Substance Abuse:    She has no and a family history of breast cancer. Osteoporosis History:    Family history does not include a first or second degree relative with osteopenia or osteoporosis. A bone density scan has not been previously obtained. Past Medical History:   Diagnosis Date    Hx of abnormal Pap smear 2012    HPV Positive     Past Surgical History:   Procedure Laterality Date    HX DILATION AND CURETTAGE      HX HYSTEROSCOPY      HX LAPAROSCOPIC SUPRACERVICAL HYSTERECTOMY  2010    HX MYOMECTOMY       Tobacco History:  reports that she has never smoked. She has never used smokeless tobacco.  Alcohol Abuse:  reports current alcohol use. Drug Abuse:  reports no history of drug use. Current Outpatient Medications   Medication Sig Dispense Refill    atorvastatin (LIPITOR) 20 mg tablet TAKE 1 TABLET BY MOUTH EVERYDAY AT BEDTIME      lisinopril (PRINIVIL, ZESTRIL) 10 mg tablet       naproxen (NAPROSYN) 500 mg tablet TAKE 1 TABLET BY MOUTH TWICE A DAY      triamcinolone acetonide (KENALOG) 0.1 % ointment       valsartan (DIOVAN) 80 mg tablet       clindamycin (CLEOCIN T) 1 % lotion APPLY TO AFFECTED AREA TWICE A DAY  0    naproxen sodium (ALEVE) 220 mg tablet Take 220 mg by mouth two (2) times daily (with meals).        Allergies: Penicillins   Social History     Socioeconomic History    Marital status: SINGLE     Spouse name: Not on file    Number of children: Not on file    Years of education: Not on file    Highest education level: Not on file   Occupational History    Not on file   Social Needs    Financial resource strain: Not on file    Food insecurity     Worry: Not on file     Inability: Not on file    Transportation needs     Medical: Not on file     Non-medical: Not on file   Tobacco Use    Smoking status: Never Smoker    Smokeless tobacco: Never Used   Substance and Sexual Activity    Alcohol use: Yes     Comment: occ    Drug use: No    Sexual activity: Yes   Lifestyle    Physical activity     Days per week: Not on file     Minutes per session: Not on file    Stress: Not on file   Relationships    Social connections     Talks on phone: Not on file     Gets together: Not on file     Attends Evangelical service: Not on file     Active member of club or organization: Not on file     Attends meetings of clubs or organizations: Not on file     Relationship status: Not on file    Intimate partner violence     Fear of current or ex partner: Not on file     Emotionally abused: Not on file     Physically abused: Not on file     Forced sexual activity: Not on file   Other Topics Concern    Not on file   Social History Narrative    Not on file     There is no problem list on file for this patient.       Review of Systems - History obtained from the patient  Constitutional: negative for weight loss, fever, night sweats  HEENT: negative for hearing loss, earache, congestion, snoring, sorethroat  CV: negative for chest pain, palpitations, edema  Resp: negative for cough, shortness of breath, wheezing  GI: negative for change in bowel habits, abdominal pain, black or bloody stools  : negative for frequency, dysuria, hematuria, vaginal discharge  MSK: negative for back pain, joint pain, muscle pain  Breast: negative for breast lumps, nipple discharge, galactorrhea  Skin :negative for itching, rash, hives  Neuro: negative for dizziness, headache, confusion, weakness  Psych: negative for anxiety, depression, change in mood  Heme/lymph: negative for bleeding, bruising, pallor    Physical Exam    Visit Vitals  /74   Wt 201 lb (91.2 kg)   BMI 31.48 kg/m²     Constitutional  · Appearance: well-nourished, well developed, alert, in no acute distress    HENT  · Head and Face: appears normal    Neck  · Inspection/Palpation: normal appearance, no masses or tenderness  Lymph Nodes: no lymphadenopathy present    Chest  · Respiratory Effort: breathing normal    Breasts  · Inspection of Breasts: breasts symmetrical, no skin changes, no discharge present, nipple appearance normal, no skin retraction present  · Palpation of Breasts and Axillae: no masses present on palpation, no breast tenderness  · Axillary Lymph Nodes: no lymphadenopathy present    Gastrointestinal  · Abdominal Examination: abdomen non-tender to palpation, normal bowel sounds, no masses present  · Liver and spleen: no hepatomegaly present, spleen not palpable  · Hernias: no hernias identified    Genitourinary  · External Genitalia: normal appearance for age, no discharge present, no tenderness present, no inflammatory lesions present, no masses present, no atrophy present  · Vagina: normal vaginal vault without central or paravaginal defects, no discharge present, no inflammatory lesions present, no masses present  · Bladder: non-tender to palpation  · Urethra: appears normal  · Cervix: normal  · Uterus: absent  · Adnexa: no adnexal tenderness present, no adnexal masses present  · Perineum: perineum within normal limits, no evidence of trauma, no rashes or skin lesions present  · Anus: anus within normal limits, no hemorrhoids present  · Inguinal Lymph Nodes: no lymphadenopathy present      Skin  · General Inspection: no rash, no lesions identified    Neurologic/Psychiatric  · Mental Status:  · Orientation: grossly oriented to person, place and time  · Mood and Affect: mood normal, affect appropriate    . Assessment:  Routine gynecologic examination  Her current medical status is satisfactory with no evidence of significant gynecologic issues.     Plan:  Counseled re: diet, exercise, healthy lifestyle  Return for yearly wellness visits  Rec annual mammogram  Patient Verbalized understanding

## 2022-03-28 ENCOUNTER — HOSPITAL ENCOUNTER (EMERGENCY)
Age: 57
Discharge: HOME | End: 2022-03-28
Attending: STUDENT IN AN ORGANIZED HEALTH CARE EDUCATION/TRAINING PROGRAM
Payer: COMMERCIAL

## 2022-03-28 ENCOUNTER — APPOINTMENT (OUTPATIENT)
Dept: CT IMAGING | Age: 57
End: 2022-03-28
Payer: COMMERCIAL

## 2022-03-28 VITALS
HEART RATE: 98 BPM | WEIGHT: 210 LBS | TEMPERATURE: 98.5 F | HEIGHT: 67 IN | SYSTOLIC BLOOD PRESSURE: 123 MMHG | DIASTOLIC BLOOD PRESSURE: 86 MMHG | RESPIRATION RATE: 20 BRPM | BODY MASS INDEX: 32.96 KG/M2 | OXYGEN SATURATION: 96 %

## 2022-03-28 DIAGNOSIS — R31.9 URINARY TRACT INFECTION WITH HEMATURIA, SITE UNSPECIFIED: Primary | ICD-10-CM

## 2022-03-28 DIAGNOSIS — N39.0 URINARY TRACT INFECTION WITH HEMATURIA, SITE UNSPECIFIED: Primary | ICD-10-CM

## 2022-03-28 LAB
ALBUMIN SERPL-MCNC: 3.7 G/DL (ref 3.5–5)
ALBUMIN/GLOB SERPL: 0.9 {RATIO} (ref 1.1–2.2)
ALP SERPL-CCNC: 124 U/L (ref 45–117)
ALT SERPL-CCNC: 55 U/L (ref 12–78)
ANION GAP SERPL CALC-SCNC: 4 MMOL/L (ref 5–15)
APPEARANCE UR: CLEAR
AST SERPL-CCNC: 52 U/L (ref 15–37)
BACTERIA URNS QL MICRO: NEGATIVE /HPF
BASOPHILS # BLD: 0.1 K/UL (ref 0–0.1)
BASOPHILS NFR BLD: 1 % (ref 0–1)
BILIRUB SERPL-MCNC: 0.6 MG/DL (ref 0.2–1)
BILIRUB UR QL: NEGATIVE
BUN SERPL-MCNC: 18 MG/DL (ref 6–20)
BUN/CREAT SERPL: 21 (ref 12–20)
CALCIUM SERPL-MCNC: 9.6 MG/DL (ref 8.5–10.1)
CHLORIDE SERPL-SCNC: 106 MMOL/L (ref 97–108)
CO2 SERPL-SCNC: 29 MMOL/L (ref 21–32)
COLOR UR: ABNORMAL
CREAT SERPL-MCNC: 0.85 MG/DL (ref 0.55–1.02)
DIFFERENTIAL METHOD BLD: NORMAL
EOSINOPHIL # BLD: 0.2 K/UL (ref 0–0.4)
EOSINOPHIL NFR BLD: 2 % (ref 0–7)
EPITH CASTS URNS QL MICRO: ABNORMAL /LPF
ERYTHROCYTE [DISTWIDTH] IN BLOOD BY AUTOMATED COUNT: 12.6 % (ref 11.5–14.5)
GLOBULIN SER CALC-MCNC: 4.1 G/DL (ref 2–4)
GLUCOSE SERPL-MCNC: 90 MG/DL (ref 65–100)
GLUCOSE UR STRIP.AUTO-MCNC: NEGATIVE MG/DL
HCT VFR BLD AUTO: 44.2 % (ref 35–47)
HGB BLD-MCNC: 14.6 G/DL (ref 11.5–16)
HGB UR QL STRIP: ABNORMAL
HYALINE CASTS URNS QL MICRO: ABNORMAL /LPF (ref 0–2)
IMM GRANULOCYTES # BLD AUTO: 0 K/UL (ref 0–0.04)
IMM GRANULOCYTES NFR BLD AUTO: 0 % (ref 0–0.5)
KETONES UR QL STRIP.AUTO: ABNORMAL MG/DL
LEUKOCYTE ESTERASE UR QL STRIP.AUTO: ABNORMAL
LYMPHOCYTES # BLD: 2 K/UL (ref 0.8–3.5)
LYMPHOCYTES NFR BLD: 24 % (ref 12–49)
MCH RBC QN AUTO: 30 PG (ref 26–34)
MCHC RBC AUTO-ENTMCNC: 33 G/DL (ref 30–36.5)
MCV RBC AUTO: 90.8 FL (ref 80–99)
MONOCYTES # BLD: 0.6 K/UL (ref 0–1)
MONOCYTES NFR BLD: 8 % (ref 5–13)
NEUTS SEG # BLD: 5.4 K/UL (ref 1.8–8)
NEUTS SEG NFR BLD: 65 % (ref 32–75)
NITRITE UR QL STRIP.AUTO: NEGATIVE
NRBC # BLD: 0 K/UL (ref 0–0.01)
NRBC BLD-RTO: 0 PER 100 WBC
PH UR STRIP: 5.5 [PH] (ref 5–8)
PLATELET # BLD AUTO: 285 K/UL (ref 150–400)
PMV BLD AUTO: 10.8 FL (ref 8.9–12.9)
POTASSIUM SERPL-SCNC: 4.3 MMOL/L (ref 3.5–5.1)
PROT SERPL-MCNC: 7.8 G/DL (ref 6.4–8.2)
PROT UR STRIP-MCNC: 30 MG/DL
RBC # BLD AUTO: 4.87 M/UL (ref 3.8–5.2)
RBC #/AREA URNS HPF: ABNORMAL /HPF (ref 0–5)
SODIUM SERPL-SCNC: 139 MMOL/L (ref 136–145)
SP GR UR REFRACTOMETRY: 1.03 (ref 1–1.03)
UA: UC IF INDICATED,UAUC: ABNORMAL
UROBILINOGEN UR QL STRIP.AUTO: 1 EU/DL (ref 0.2–1)
WBC # BLD AUTO: 8.2 K/UL (ref 3.6–11)
WBC URNS QL MICRO: ABNORMAL /HPF (ref 0–4)

## 2022-03-28 PROCEDURE — 87086 URINE CULTURE/COLONY COUNT: CPT

## 2022-03-28 PROCEDURE — 80053 COMPREHEN METABOLIC PANEL: CPT

## 2022-03-28 PROCEDURE — 99284 EMERGENCY DEPT VISIT MOD MDM: CPT

## 2022-03-28 PROCEDURE — 87186 SC STD MICRODIL/AGAR DIL: CPT

## 2022-03-28 PROCEDURE — 85025 COMPLETE CBC W/AUTO DIFF WBC: CPT

## 2022-03-28 PROCEDURE — 36415 COLL VENOUS BLD VENIPUNCTURE: CPT

## 2022-03-28 PROCEDURE — 81001 URINALYSIS AUTO W/SCOPE: CPT

## 2022-03-28 PROCEDURE — 87077 CULTURE AEROBIC IDENTIFY: CPT

## 2022-03-28 PROCEDURE — 74176 CT ABD & PELVIS W/O CONTRAST: CPT

## 2022-03-28 RX ORDER — NITROFURANTOIN 25; 75 MG/1; MG/1
100 CAPSULE ORAL 2 TIMES DAILY
Qty: 14 CAPSULE | Refills: 0 | Status: SHIPPED | OUTPATIENT
Start: 2022-03-28 | End: 2022-04-04

## 2022-03-28 NOTE — ED PROVIDER NOTES
Date of Service:  3/28/2022    Patient:  Kash Cook    Chief Complaint:  Back Pain, Diarrhea, and Urinary Frequency       HPI:  Kash Cook is a 49 y.o.  female who presents for evaluation of right flank pain, lower back pain, urinary frequency and dysuria x2 days. Patient states past medical history of kidney infections. Patient denies chest pain, or shortness of breath. Patient denies fever, chills, or hematuria. Patient denies history of kidney stones. Past Medical History:   Diagnosis Date    Hx of abnormal Pap smear 2012    HPV Positive       Past Surgical History:   Procedure Laterality Date    HX DILATION AND CURETTAGE      HX HYSTEROSCOPY      HX LAPAROSCOPIC SUPRACERVICAL HYSTERECTOMY  2010    HX MYOMECTOMY           No family history on file. Social History     Socioeconomic History    Marital status: SINGLE     Spouse name: Not on file    Number of children: Not on file    Years of education: Not on file    Highest education level: Not on file   Occupational History    Not on file   Tobacco Use    Smoking status: Never Smoker    Smokeless tobacco: Never Used   Substance and Sexual Activity    Alcohol use: Yes     Comment: occ    Drug use: No    Sexual activity: Yes   Other Topics Concern    Not on file   Social History Narrative    Not on file     Social Determinants of Health     Financial Resource Strain:     Difficulty of Paying Living Expenses: Not on file   Food Insecurity:     Worried About Running Out of Food in the Last Year: Not on file    David of Food in the Last Year: Not on file   Transportation Needs:     Lack of Transportation (Medical): Not on file    Lack of Transportation (Non-Medical):  Not on file   Physical Activity:     Days of Exercise per Week: Not on file    Minutes of Exercise per Session: Not on file   Stress:     Feeling of Stress : Not on file   Social Connections:     Frequency of Communication with Friends and Family: Not on file    Frequency of Social Gatherings with Friends and Family: Not on file    Attends Religion Services: Not on file    Active Member of Clubs or Organizations: Not on file    Attends Club or Organization Meetings: Not on file    Marital Status: Not on file   Intimate Partner Violence:     Fear of Current or Ex-Partner: Not on file    Emotionally Abused: Not on file    Physically Abused: Not on file    Sexually Abused: Not on file   Housing Stability:     Unable to Pay for Housing in the Last Year: Not on file    Number of [place] in the Last Year: Not on file    Unstable Housing in the Last Year: Not on file         ALLERGIES: Penicillins    Review of Systems   Constitutional: Negative for chills and fever. Respiratory: Negative for shortness of breath. Cardiovascular: Negative for chest pain. Gastrointestinal: Negative for abdominal pain, nausea and vomiting. Genitourinary: Positive for dysuria, flank pain, frequency and urgency. Negative for difficulty urinating. Musculoskeletal: Positive for back pain. Skin: Negative for rash. Allergic/Immunologic: Negative for immunocompromised state. Neurological: Negative for headaches. Psychiatric/Behavioral: Negative for agitation. All other systems reviewed and are negative. Vitals:    03/28/22 1052   BP: 123/86   Pulse: 98   Resp: 20   Temp: 98.5 °F (36.9 °C)   SpO2: 96%   Weight: 95.3 kg (210 lb)   Height: 5' 7\" (1.702 m)            Physical Exam  Vitals and nursing note reviewed. Constitutional:       General: She is not in acute distress. HENT:      Mouth/Throat:      Mouth: Mucous membranes are moist.   Eyes:      Conjunctiva/sclera: Conjunctivae normal.   Cardiovascular:      Rate and Rhythm: Normal rate and regular rhythm. Heart sounds: No murmur heard. Pulmonary:      Effort: Pulmonary effort is normal.      Breath sounds: Normal breath sounds. Abdominal:      Palpations: Abdomen is soft. Tenderness: There is abdominal tenderness. There is right CVA tenderness. There is no left CVA tenderness. Comments: Lower abdominal tenderness   Musculoskeletal:         General: Normal range of motion. Skin:     General: Skin is warm. Neurological:      Mental Status: She is alert and oriented to person, place, and time. Mental status is at baseline. MDM       Procedures      VITAL SIGNS:  No data found. LABS:  No results found for this or any previous visit (from the past 6 hour(s)). IMAGING:  CT ABD PELV WO CONT   Final Result   No nephrolithiasis or hydronephrosis. No evidence of acute process. Medications During Visit:  Medications - No data to display      DECISION MAKING:  Car Yeboah is a 49 y.o. female who comes in as above. CT abdomen pelvis showed no evidence of nephrolithiasis or hydronephrosis. CT abdomen pelvis showed no acute abnormality. Lab work unremarkable, however UA showed evidence of urinary tract infection. Results reviewed with patient. Patient agreed to plan of care. Patient stable upon discharge. Return precautions given the patient. IMPRESSION:  1. Urinary tract infection with hematuria, site unspecified        DISPOSITION:  Discharged      Discharge Medication List as of 3/28/2022  1:19 PM      START taking these medications    Details   nitrofurantoin, macrocrystal-monohydrate, (Macrobid) 100 mg capsule Take 1 Capsule by mouth two (2) times a day for 7 days. , Normal, Disp-14 Capsule, R-0         CONTINUE these medications which have NOT CHANGED    Details   atorvastatin (LIPITOR) 20 mg tablet TAKE 1 TABLET BY MOUTH EVERYDAY AT BEDTIME, Historical Med      naproxen (NAPROSYN) 500 mg tablet TAKE 1 TABLET BY MOUTH TWICE A DAY, Historical Med      triamcinolone acetonide (KENALOG) 0.1 % ointment Historical Med      valsartan (DIOVAN) 80 mg tablet Historical Med      lisinopril (PRINIVIL, ZESTRIL) 10 mg tablet Historical Med clindamycin (CLEOCIN T) 1 % lotion APPLY TO AFFECTED AREA TWICE A DAY, Historical Med, R-0      naproxen sodium (ALEVE) 220 mg tablet Take 220 mg by mouth two (2) times daily (with meals). , Historical Med              Follow-up Information     Follow up With Specialties Details Why Contact Info    Rian Malcolm MD [facility] Medicine Schedule an appointment as soon as possible for a visit   Jeevan Vega [phone]      [facility] EMERGENCY DEPT Emergency Medicine  If symptoms worsen Lisa Membreno [phone] [facility] Urology  Schedule an appointment as soon as possible for a visit   [address] [phone]

## 2022-03-28 NOTE — ED TRIAGE NOTES
Pt presents to ER with c/o lower back pain x2 days with episodes of diarrhea and nausea. Denies vomiting, no fevers or chills. Pt c/o urinary frequency and dry mouth. Pt reports PMHx kidney infection.

## 2022-03-30 LAB
BACTERIA SPEC CULT: ABNORMAL
BACTERIA SPEC CULT: ABNORMAL
CC UR VC: ABNORMAL
SERVICE CMNT-IMP: ABNORMAL

## 2022-07-26 ENCOUNTER — OFFICE VISIT (OUTPATIENT)
Dept: OBGYN CLINIC | Age: 57
End: 2022-07-26

## 2022-07-26 VITALS
WEIGHT: 208.2 LBS | HEART RATE: 94 BPM | SYSTOLIC BLOOD PRESSURE: 126 MMHG | DIASTOLIC BLOOD PRESSURE: 80 MMHG | BODY MASS INDEX: 32.61 KG/M2

## 2022-07-26 DIAGNOSIS — R35.0 URINATION FREQUENCY: Primary | ICD-10-CM

## 2022-07-26 DIAGNOSIS — Z01.419 WELL WOMAN EXAM WITH ROUTINE GYNECOLOGICAL EXAM: ICD-10-CM

## 2022-07-26 PROCEDURE — 99396 PREV VISIT EST AGE 40-64: CPT | Performed by: OBSTETRICS & GYNECOLOGY

## 2022-07-26 RX ORDER — NYSTATIN 100000 U/G
CREAM TOPICAL 2 TIMES DAILY
COMMUNITY

## 2022-07-26 RX ORDER — ESTRADIOL 0.1 MG/G
2 CREAM VAGINAL DAILY
COMMUNITY

## 2022-07-26 NOTE — PROGRESS NOTES
Yaa Ohara is a ,  49 y.o. female WHITE/NON- whose No LMP recorded. Patient has had a hysterectomy. was on  who presents for her annual checkup. She is having frequency with urination, would like to check to see if she has a UTI    Hormone Status:    She is not having vasomotor symptoms. The patient is using HRT: none uses vaginal estrogen that she gets through her Urologist    Sexual history:    She  reports being sexually active. Medical conditions:    Since her last annual GYN exam about one year ago, she has had the following changes in her health history: none. Pap and Mammogram History:    Her most recent Pap smear was normal obtained 2 year(s) ago. The patient had her mammogram today in our office. Breast Cancer History/Substance Abuse:    She has no and a family history of breast cancer. Osteoporosis History:    Family history does not include a first or second degree relative with osteopenia or osteoporosis. A bone density scan has not been previously obtained. Past Medical History:   Diagnosis Date    Hx of abnormal Pap smear     HPV Positive     Past Surgical History:   Procedure Laterality Date    HX DILATION AND CURETTAGE      HX HYSTEROSCOPY      HX LAPAROSCOPIC SUPRACERVICAL HYSTERECTOMY      HX MYOMECTOMY       Tobacco History:  reports that she has never smoked. She has never used smokeless tobacco.  Alcohol Abuse:  reports current alcohol use. Drug Abuse:  reports no history of drug use. Current Outpatient Medications   Medication Sig Dispense Refill    estradioL (ESTRACE) 0.01 % (0.1 mg/gram) vaginal cream Insert 2 g into vagina in the morning. nystatin (MYCOSTATIN) topical cream Apply  to affected area two (2) times a day.       atorvastatin (LIPITOR) 20 mg tablet TAKE 1 TABLET BY MOUTH EVERYDAY AT BEDTIME      naproxen (NAPROSYN) 500 mg tablet TAKE 1 TABLET BY MOUTH TWICE A DAY      triamcinolone acetonide (KENALOG) 0.1 % ointment valsartan (DIOVAN) 80 mg tablet       lisinopril (PRINIVIL, ZESTRIL) 10 mg tablet       clindamycin (CLEOCIN T) 1 % lotion APPLY TO AFFECTED AREA TWICE A DAY  0    naproxen sodium (NAPROSYN) 220 mg tablet Take 220 mg by mouth two (2) times daily (with meals). Allergies: Penicillins   Social History     Socioeconomic History    Marital status: SINGLE     Spouse name: Not on file    Number of children: Not on file    Years of education: Not on file    Highest education level: Not on file   Occupational History    Not on file   Tobacco Use    Smoking status: Never    Smokeless tobacco: Never   Substance and Sexual Activity    Alcohol use: Yes     Comment: occ    Drug use: No    Sexual activity: Yes   Other Topics Concern    Not on file   Social History Narrative    Not on file     Social Determinants of Health     Financial Resource Strain: Not on file   Food Insecurity: Not on file   Transportation Needs: Not on file   Physical Activity: Not on file   Stress: Not on file   Social Connections: Not on file   Intimate Partner Violence: Not on file   Housing Stability: Not on file     There is no problem list on file for this patient.       Review of Systems - History obtained from the patient  Constitutional: negative for weight loss, fever, night sweats  HEENT: negative for hearing loss, earache, congestion, snoring, sorethroat  CV: negative for chest pain, palpitations, edema  Resp: negative for cough, shortness of breath, wheezing  GI: negative for change in bowel habits, abdominal pain, black or bloody stools  : negative for frequency, dysuria, hematuria, vaginal discharge  MSK: negative for back pain, joint pain, muscle pain  Breast: negative for breast lumps, nipple discharge, galactorrhea  Skin :negative for itching, rash, hives  Neuro: negative for dizziness, headache, confusion, weakness  Psych: negative for anxiety, depression, change in mood  Heme/lymph: negative for bleeding, bruising, pallor    Physical Exam    Visit Vitals  /80   Pulse 94   Wt 208 lb 3.2 oz (94.4 kg)   BMI 32.61 kg/m²     Constitutional  Appearance: well-nourished, well developed, alert, in no acute distress    HENT  Head and Face: appears normal    Neck  Inspection/Palpation: normal appearance, no masses or tenderness  Lymph Nodes: no lymphadenopathy present    Chest  Respiratory Effort: breathing normal    Breasts  Inspection of Breasts: breasts symmetrical, no skin changes, no discharge present, nipple appearance normal, no skin retraction present  Palpation of Breasts and Axillae: no masses present on palpation, no breast tenderness  Axillary Lymph Nodes: no lymphadenopathy present    Gastrointestinal  Abdominal Examination: abdomen non-tender to palpation, normal bowel sounds, no masses present  Liver and spleen: no hepatomegaly present, spleen not palpable  Hernias: no hernias identified    Genitourinary  External Genitalia: normal appearance for age, no discharge present, no tenderness present, no inflammatory lesions present, no masses present, no atrophy present  Vagina: normal vaginal vault without central or paravaginal defects, no discharge present, no inflammatory lesions present, no masses present  Bladder: non-tender to palpation  Urethra: appears normal  Cervix: normal  Uterus: absent  Adnexa: no adnexal tenderness present, no adnexal masses present  Perineum: perineum within normal limits, no evidence of trauma, no rashes or skin lesions present  Anus: anus within normal limits, no hemorrhoids present  Inguinal Lymph Nodes: no lymphadenopathy present      Skin  General Inspection: no rash, no lesions identified    Neurologic/Psychiatric  Mental Status:  Orientation: grossly oriented to person, place and time  Mood and Affect: mood normal, affect appropriate    . U/A dip is normal    Assessment:  Routine gynecologic examination  Her current medical status is satisfactory with no evidence of significant gynecologic issues.   Frequency    Plan:  Counseled re: diet, exercise, healthy lifestyle  Return for yearly wellness visits  Rec annual mammogram  Patient Verbalized understanding  Will check urine C&S

## 2022-07-28 LAB
BACTERIA UR CULT: ABNORMAL
BACTERIA UR CULT: ABNORMAL

## 2022-07-28 RX ORDER — NITROFURANTOIN 25; 75 MG/1; MG/1
100 CAPSULE ORAL 2 TIMES DAILY
Qty: 14 CAPSULE | Refills: 0 | Status: SHIPPED | OUTPATIENT
Start: 2022-07-28 | End: 2022-08-04

## 2022-07-28 NOTE — PROGRESS NOTES
Your urine culture shows a bladder infection. I will send a prescription to your pharmacy to treat it.

## 2022-08-24 ENCOUNTER — TRANSCRIBE ORDER (OUTPATIENT)
Dept: SCHEDULING | Age: 57
End: 2022-08-24

## 2022-08-24 DIAGNOSIS — R11.0 NAUSEA: Primary | ICD-10-CM

## 2022-08-24 DIAGNOSIS — R74.8 ELEVATED LIVER ENZYMES: ICD-10-CM

## 2022-09-06 ENCOUNTER — APPOINTMENT (OUTPATIENT)
Dept: MRI IMAGING | Age: 57
End: 2022-09-06
Attending: EMERGENCY MEDICINE
Payer: COMMERCIAL

## 2022-09-06 ENCOUNTER — HOSPITAL ENCOUNTER (EMERGENCY)
Age: 57
Discharge: HOME | End: 2022-09-06
Attending: EMERGENCY MEDICINE
Payer: COMMERCIAL

## 2022-09-06 VITALS
BODY MASS INDEX: 31.39 KG/M2 | SYSTOLIC BLOOD PRESSURE: 155 MMHG | WEIGHT: 200 LBS | DIASTOLIC BLOOD PRESSURE: 97 MMHG | HEART RATE: 92 BPM | HEIGHT: 67 IN | TEMPERATURE: 98 F | RESPIRATION RATE: 16 BRPM | OXYGEN SATURATION: 96 %

## 2022-09-06 DIAGNOSIS — M51.16 LUMBAR DISC DISEASE WITH RADICULOPATHY: ICD-10-CM

## 2022-09-06 DIAGNOSIS — S39.012A LUMBAR STRAIN, INITIAL ENCOUNTER: Primary | ICD-10-CM

## 2022-09-06 PROCEDURE — 72148 MRI LUMBAR SPINE W/O DYE: CPT

## 2022-09-06 PROCEDURE — 99284 EMERGENCY DEPT VISIT MOD MDM: CPT

## 2022-09-06 PROCEDURE — 74011250637 HC RX REV CODE- 250/637: Performed by: EMERGENCY MEDICINE

## 2022-09-06 PROCEDURE — 74011250636 HC RX REV CODE- 250/636: Performed by: EMERGENCY MEDICINE

## 2022-09-06 PROCEDURE — 96372 THER/PROPH/DIAG INJ SC/IM: CPT

## 2022-09-06 PROCEDURE — 74011000250 HC RX REV CODE- 250: Performed by: EMERGENCY MEDICINE

## 2022-09-06 PROCEDURE — 74011636637 HC RX REV CODE- 636/637: Performed by: EMERGENCY MEDICINE

## 2022-09-06 RX ORDER — PREDNISONE 20 MG/1
60 TABLET ORAL
Status: COMPLETED | OUTPATIENT
Start: 2022-09-06 | End: 2022-09-06

## 2022-09-06 RX ORDER — KETOROLAC TROMETHAMINE 10 MG/1
10 TABLET, FILM COATED ORAL
Qty: 20 TABLET | Refills: 0 | Status: SHIPPED | OUTPATIENT
Start: 2022-09-06 | End: 2022-09-11

## 2022-09-06 RX ORDER — LIDOCAINE 4 G/100G
1 PATCH TOPICAL
Status: DISCONTINUED | OUTPATIENT
Start: 2022-09-06 | End: 2022-09-07 | Stop reason: HOSPADM

## 2022-09-06 RX ORDER — OXYCODONE AND ACETAMINOPHEN 5; 325 MG/1; MG/1
1 TABLET ORAL
Status: COMPLETED | OUTPATIENT
Start: 2022-09-06 | End: 2022-09-06

## 2022-09-06 RX ORDER — METHOCARBAMOL 750 MG/1
750 TABLET, FILM COATED ORAL 4 TIMES DAILY
Qty: 20 TABLET | Refills: 0 | Status: SHIPPED | OUTPATIENT
Start: 2022-09-06 | End: 2022-09-11

## 2022-09-06 RX ORDER — METHOCARBAMOL 500 MG/1
1000 TABLET, FILM COATED ORAL
Status: COMPLETED | OUTPATIENT
Start: 2022-09-06 | End: 2022-09-06

## 2022-09-06 RX ORDER — PREDNISONE 10 MG/1
TABLET ORAL
Qty: 21 TABLET | Refills: 0 | Status: SHIPPED | OUTPATIENT
Start: 2022-09-06

## 2022-09-06 RX ORDER — LIDOCAINE 4 G/100G
1 PATCH TOPICAL EVERY 24 HOURS
Qty: 7 PATCH | Refills: 0 | Status: SHIPPED | OUTPATIENT
Start: 2022-09-06 | End: 2022-09-13

## 2022-09-06 RX ORDER — KETOROLAC TROMETHAMINE 30 MG/ML
60 INJECTION, SOLUTION INTRAMUSCULAR; INTRAVENOUS
Status: COMPLETED | OUTPATIENT
Start: 2022-09-06 | End: 2022-09-06

## 2022-09-06 RX ADMIN — KETOROLAC TROMETHAMINE 60 MG: 30 INJECTION, SOLUTION INTRAMUSCULAR at 21:12

## 2022-09-06 RX ADMIN — METHOCARBAMOL TABLETS 1000 MG: 500 TABLET, COATED ORAL at 21:12

## 2022-09-06 RX ADMIN — PREDNISONE 60 MG: 20 TABLET ORAL at 21:12

## 2022-09-06 RX ADMIN — OXYCODONE HYDROCHLORIDE AND ACETAMINOPHEN 1 TABLET: 5; 325 TABLET ORAL at 21:12

## 2022-09-06 NOTE — ED TRIAGE NOTES
Patient reports low/mid back pain after injury yesterday dumping out a kiddie pool. States now her thighs are becoming painful. Patient reports taking tylenol and muscle relaxers without relief. States she has tried ice. Increased pain with movement.    Patient has no know back issues

## 2022-09-07 NOTE — ED PROVIDER NOTES
42-year-old female presents from home with a complaint of low back pain. Symptoms started suddenly yesterday afternoon she was picking up kidney pulls to empty done of the water. She picked 1 up and twisted to the right and had sudden onset of sharp pain in the right lower part of her back. Pain has been worsened with sitting and certain movements. He has been taking ibuprofen and a muscle relaxer with no relief of her symptoms. She is now complaining of some numbness and weakness in her right leg. Denies any urinary symptoms. No cough or fevers. Never had any injury like this before. No history of back problems. Denies any significant past medical history. Past Medical History:   Diagnosis Date    Hx of abnormal Pap smear 2012    HPV Positive       Past Surgical History:   Procedure Laterality Date    HX DILATION AND CURETTAGE      HX HYSTEROSCOPY      HX LAPAROSCOPIC SUPRACERVICAL HYSTERECTOMY  2010    HX MYOMECTOMY           No family history on file. Social History     Socioeconomic History    Marital status: SINGLE     Spouse name: Not on file    Number of children: Not on file    Years of education: Not on file    Highest education level: Not on file   Occupational History    Not on file   Tobacco Use    Smoking status: Never    Smokeless tobacco: Never   Substance and Sexual Activity    Alcohol use: Yes     Comment: occ    Drug use: No    Sexual activity: Yes   Other Topics Concern    Not on file   Social History Narrative    Not on file     Social Determinants of Health     Financial Resource Strain: Not on file   Food Insecurity: Not on file   Transportation Needs: Not on file   Physical Activity: Not on file   Stress: Not on file   Social Connections: Not on file   Intimate Partner Violence: Not on file   Housing Stability: Not on file         ALLERGIES: Penicillins    Review of Systems   Constitutional:  Negative for fever. HENT:  Negative for facial swelling.     Eyes:  Negative for visual disturbance. Respiratory:  Negative for chest tightness. Cardiovascular:  Negative for chest pain. Gastrointestinal:  Negative for abdominal pain. Genitourinary:  Negative for difficulty urinating and dysuria. Musculoskeletal:  Negative for arthralgias. Skin:  Negative for rash. Neurological:  Negative for headaches. Hematological:  Negative for adenopathy. Psychiatric/Behavioral:  Negative for suicidal ideas. Vitals:    09/06/22 1932   BP: (!) 155/97   Pulse: 92   Resp: 16   Temp: 98 °F (36.7 °C)   SpO2: 96%   Weight: 90.7 kg (200 lb)   Height: 5' 7\" (1.702 m)            Physical Exam  Vitals and nursing note reviewed. Constitutional:       General: She is in acute distress. Appearance: She is well-developed. HENT:      Head: Normocephalic and atraumatic. Eyes:      General: No scleral icterus. Conjunctiva/sclera: Conjunctivae normal.      Pupils: Pupils are equal, round, and reactive to light. Cardiovascular:      Rate and Rhythm: Normal rate. Heart sounds: No murmur heard. Pulmonary:      Effort: Pulmonary effort is normal. No respiratory distress. Abdominal:      General: There is no distension. Musculoskeletal:         General: Normal range of motion. Cervical back: Normal range of motion and neck supple. Skin:     General: Skin is warm and dry. Findings: No rash. Neurological:      Mental Status: She is alert and oriented to person, place, and time. MDM  Number of Diagnoses or Management Options  Diagnosis management comments: Assessment: Patient with acute onset of right lower back pain now with right leg radiculopathy. MRI showing mild canal and right lateral recess stenosis at L4-L5 with effacement of the nerve roots. This certainly correlates with the patient's symptoms. She is feeling much better after medications in the ED. I think she can be discharged home on steroid taper and continue pain management.   She is given contact information for follow-up with spine orthopedics if needed.        Amount and/or Complexity of Data Reviewed  Tests in the radiology section of CPT®: reviewed           Procedures

## 2022-09-07 NOTE — ED NOTES
Patient discharged by provider. D/C instructions given. Patient educated to take all medications as instructed for management at home. Patien verbalized understanding, verbalized no questions. Patient ambulated out of ER without difficulty, NAD.   Patient Vitals for the past 4 hrs:   Temp Pulse Resp BP SpO2   09/06/22 1932 98 °F (36.7 °C) 92 16 (!) 155/97 96 %

## 2023-01-23 ENCOUNTER — APPOINTMENT (OUTPATIENT)
Dept: NON INVASIVE DIAGNOSTICS | Age: 58
DRG: 282 | End: 2023-01-23
Attending: NURSE PRACTITIONER
Payer: COMMERCIAL

## 2023-01-23 ENCOUNTER — APPOINTMENT (OUTPATIENT)
Dept: CT IMAGING | Age: 58
DRG: 282 | End: 2023-01-23
Attending: STUDENT IN AN ORGANIZED HEALTH CARE EDUCATION/TRAINING PROGRAM
Payer: COMMERCIAL

## 2023-01-23 ENCOUNTER — APPOINTMENT (OUTPATIENT)
Dept: GENERAL RADIOLOGY | Age: 58
DRG: 282 | End: 2023-01-23
Attending: STUDENT IN AN ORGANIZED HEALTH CARE EDUCATION/TRAINING PROGRAM
Payer: COMMERCIAL

## 2023-01-23 ENCOUNTER — HOSPITAL ENCOUNTER (INPATIENT)
Age: 58
LOS: 2 days | Discharge: HOME | DRG: 282 | End: 2023-01-25
Attending: STUDENT IN AN ORGANIZED HEALTH CARE EDUCATION/TRAINING PROGRAM | Admitting: HOSPITALIST
Payer: COMMERCIAL

## 2023-01-23 ENCOUNTER — TRANSCRIBE ORDER (OUTPATIENT)
Dept: CARDIAC REHAB | Age: 58
End: 2023-01-23

## 2023-01-23 DIAGNOSIS — I10 HYPERTENSION, UNSPECIFIED TYPE: ICD-10-CM

## 2023-01-23 DIAGNOSIS — I21.4 NON-ST ELEVATION MYOCARDIAL INFARCTION (NSTEMI) (HCC): ICD-10-CM

## 2023-01-23 DIAGNOSIS — E78.5 DYSLIPIDEMIA: ICD-10-CM

## 2023-01-23 DIAGNOSIS — I21.4 ACUTE MYOCARDIAL INFARCTION, SUBENDOCARDIAL INFARCTION, INITIAL EPISODE OF CARE (HCC): Primary | ICD-10-CM

## 2023-01-23 DIAGNOSIS — I21.4 NSTEMI (NON-ST ELEVATED MYOCARDIAL INFARCTION) (HCC): Primary | ICD-10-CM

## 2023-01-23 DIAGNOSIS — R77.8 ELEVATED TROPONIN: ICD-10-CM

## 2023-01-23 LAB
ALBUMIN SERPL-MCNC: 3.8 G/DL (ref 3.5–5)
ALBUMIN/GLOB SERPL: 1 (ref 1.1–2.2)
ALP SERPL-CCNC: 128 U/L (ref 45–117)
ALT SERPL-CCNC: 44 U/L (ref 12–78)
ANION GAP SERPL CALC-SCNC: 9 MMOL/L (ref 5–15)
APTT PPP: 23.8 SEC (ref 22.1–31)
AST SERPL-CCNC: 21 U/L (ref 15–37)
BASOPHILS # BLD: 0.1 K/UL (ref 0–0.1)
BASOPHILS NFR BLD: 1 % (ref 0–1)
BILIRUB SERPL-MCNC: 0.7 MG/DL (ref 0.2–1)
BUN SERPL-MCNC: 20 MG/DL (ref 6–20)
BUN/CREAT SERPL: 24 (ref 12–20)
CALCIUM SERPL-MCNC: 9.5 MG/DL (ref 8.5–10.1)
CHLORIDE SERPL-SCNC: 105 MMOL/L (ref 97–108)
CO2 SERPL-SCNC: 25 MMOL/L (ref 21–32)
COMMENT, HOLDF: NORMAL
CREAT SERPL-MCNC: 0.83 MG/DL (ref 0.55–1.02)
D DIMER PPP FEU-MCNC: 0.19 MG/L FEU (ref 0–0.65)
DIFFERENTIAL METHOD BLD: NORMAL
EOSINOPHIL # BLD: 0.2 K/UL (ref 0–0.4)
EOSINOPHIL NFR BLD: 2 % (ref 0–7)
ERYTHROCYTE [DISTWIDTH] IN BLOOD BY AUTOMATED COUNT: 12.6 % (ref 11.5–14.5)
GLOBULIN SER CALC-MCNC: 4 G/DL (ref 2–4)
GLUCOSE SERPL-MCNC: 164 MG/DL (ref 65–100)
HCT VFR BLD AUTO: 43.5 % (ref 35–47)
HGB BLD-MCNC: 14.6 G/DL (ref 11.5–16)
IMM GRANULOCYTES # BLD AUTO: 0 K/UL (ref 0–0.04)
IMM GRANULOCYTES NFR BLD AUTO: 0 % (ref 0–0.5)
INR PPP: 1 (ref 0.9–1.1)
LYMPHOCYTES # BLD: 2.3 K/UL (ref 0.8–3.5)
LYMPHOCYTES NFR BLD: 29 % (ref 12–49)
MAGNESIUM SERPL-MCNC: 2.3 MG/DL (ref 1.6–2.4)
MCH RBC QN AUTO: 29.9 PG (ref 26–34)
MCHC RBC AUTO-ENTMCNC: 33.6 G/DL (ref 30–36.5)
MCV RBC AUTO: 89.1 FL (ref 80–99)
MONOCYTES # BLD: 0.4 K/UL (ref 0–1)
MONOCYTES NFR BLD: 5 % (ref 5–13)
NEUTS SEG # BLD: 5 K/UL (ref 1.8–8)
NEUTS SEG NFR BLD: 63 % (ref 32–75)
NRBC # BLD: 0 K/UL (ref 0–0.01)
NRBC BLD-RTO: 0 PER 100 WBC
PLATELET # BLD AUTO: 274 K/UL (ref 150–400)
PMV BLD AUTO: 11.1 FL (ref 8.9–12.9)
POTASSIUM SERPL-SCNC: 4.2 MMOL/L (ref 3.5–5.1)
PROT SERPL-MCNC: 7.8 G/DL (ref 6.4–8.2)
PROTHROMBIN TIME: 10.3 SEC (ref 9–11.1)
RBC # BLD AUTO: 4.88 M/UL (ref 3.8–5.2)
SAMPLES BEING HELD,HOLD: NORMAL
SODIUM SERPL-SCNC: 139 MMOL/L (ref 136–145)
THERAPEUTIC RANGE,PTTT: NORMAL SECS (ref 58–77)
TROPONIN-HIGH SENSITIVITY: 1597 NG/L (ref 0–51)
TROPONIN-HIGH SENSITIVITY: 7 NG/L (ref 0–51)
UFH PPP CHRO-ACNC: <0.1 IU/ML
WBC # BLD AUTO: 8 K/UL (ref 3.6–11)

## 2023-01-23 PROCEDURE — 99223 1ST HOSP IP/OBS HIGH 75: CPT | Performed by: SPECIALIST

## 2023-01-23 PROCEDURE — 74011250636 HC RX REV CODE- 250/636: Performed by: STUDENT IN AN ORGANIZED HEALTH CARE EDUCATION/TRAINING PROGRAM

## 2023-01-23 PROCEDURE — 93458 L HRT ARTERY/VENTRICLE ANGIO: CPT | Performed by: STUDENT IN AN ORGANIZED HEALTH CARE EDUCATION/TRAINING PROGRAM

## 2023-01-23 PROCEDURE — 96361 HYDRATE IV INFUSION ADD-ON: CPT

## 2023-01-23 PROCEDURE — 74011000250 HC RX REV CODE- 250: Performed by: STUDENT IN AN ORGANIZED HEALTH CARE EDUCATION/TRAINING PROGRAM

## 2023-01-23 PROCEDURE — 99152 MOD SED SAME PHYS/QHP 5/>YRS: CPT | Performed by: STUDENT IN AN ORGANIZED HEALTH CARE EDUCATION/TRAINING PROGRAM

## 2023-01-23 PROCEDURE — 77030008543 HC TBNG MON PRSS MRTM -A: Performed by: STUDENT IN AN ORGANIZED HEALTH CARE EDUCATION/TRAINING PROGRAM

## 2023-01-23 PROCEDURE — 99285 EMERGENCY DEPT VISIT HI MDM: CPT

## 2023-01-23 PROCEDURE — 74011000636 HC RX REV CODE- 636: Performed by: STUDENT IN AN ORGANIZED HEALTH CARE EDUCATION/TRAINING PROGRAM

## 2023-01-23 PROCEDURE — 96374 THER/PROPH/DIAG INJ IV PUSH: CPT

## 2023-01-23 PROCEDURE — 84484 ASSAY OF TROPONIN QUANT: CPT

## 2023-01-23 PROCEDURE — 93005 ELECTROCARDIOGRAM TRACING: CPT

## 2023-01-23 PROCEDURE — 71275 CT ANGIOGRAPHY CHEST: CPT

## 2023-01-23 PROCEDURE — C1894 INTRO/SHEATH, NON-LASER: HCPCS | Performed by: STUDENT IN AN ORGANIZED HEALTH CARE EDUCATION/TRAINING PROGRAM

## 2023-01-23 PROCEDURE — 74011250636 HC RX REV CODE- 250/636: Performed by: HOSPITALIST

## 2023-01-23 PROCEDURE — 77030019569 HC BND COMPR RAD TERU -B: Performed by: STUDENT IN AN ORGANIZED HEALTH CARE EDUCATION/TRAINING PROGRAM

## 2023-01-23 PROCEDURE — 85520 HEPARIN ASSAY: CPT

## 2023-01-23 PROCEDURE — 74011000250 HC RX REV CODE- 250: Performed by: HOSPITALIST

## 2023-01-23 PROCEDURE — 80053 COMPREHEN METABOLIC PANEL: CPT

## 2023-01-23 PROCEDURE — C1769 GUIDE WIRE: HCPCS | Performed by: STUDENT IN AN ORGANIZED HEALTH CARE EDUCATION/TRAINING PROGRAM

## 2023-01-23 PROCEDURE — 83735 ASSAY OF MAGNESIUM: CPT

## 2023-01-23 PROCEDURE — 85379 FIBRIN DEGRADATION QUANT: CPT

## 2023-01-23 PROCEDURE — 65270000046 HC RM TELEMETRY

## 2023-01-23 PROCEDURE — 36415 COLL VENOUS BLD VENIPUNCTURE: CPT

## 2023-01-23 PROCEDURE — 77030040934 HC CATH DIAG DXTERITY MEDT -A: Performed by: STUDENT IN AN ORGANIZED HEALTH CARE EDUCATION/TRAINING PROGRAM

## 2023-01-23 PROCEDURE — 77030029065 HC DRSG HEMO QCLOT ZMED -B

## 2023-01-23 PROCEDURE — 74011250637 HC RX REV CODE- 250/637: Performed by: HOSPITALIST

## 2023-01-23 PROCEDURE — 74011250636 HC RX REV CODE- 250/636: Performed by: NURSE PRACTITIONER

## 2023-01-23 PROCEDURE — 85610 PROTHROMBIN TIME: CPT

## 2023-01-23 PROCEDURE — 4A023N7 MEASUREMENT OF CARDIAC SAMPLING AND PRESSURE, LEFT HEART, PERCUTANEOUS APPROACH: ICD-10-PCS | Performed by: INTERNAL MEDICINE

## 2023-01-23 PROCEDURE — B2111ZZ FLUOROSCOPY OF MULTIPLE CORONARY ARTERIES USING LOW OSMOLAR CONTRAST: ICD-10-PCS | Performed by: INTERNAL MEDICINE

## 2023-01-23 PROCEDURE — 85730 THROMBOPLASTIN TIME PARTIAL: CPT

## 2023-01-23 PROCEDURE — 2709999900 HC NON-CHARGEABLE SUPPLY: Performed by: STUDENT IN AN ORGANIZED HEALTH CARE EDUCATION/TRAINING PROGRAM

## 2023-01-23 PROCEDURE — 77030019698 HC SYR ANGI MDLON MRTM -A: Performed by: STUDENT IN AN ORGANIZED HEALTH CARE EDUCATION/TRAINING PROGRAM

## 2023-01-23 PROCEDURE — 71045 X-RAY EXAM CHEST 1 VIEW: CPT

## 2023-01-23 PROCEDURE — 96375 TX/PRO/DX INJ NEW DRUG ADDON: CPT

## 2023-01-23 PROCEDURE — 74011250637 HC RX REV CODE- 250/637: Performed by: STUDENT IN AN ORGANIZED HEALTH CARE EDUCATION/TRAINING PROGRAM

## 2023-01-23 PROCEDURE — 85025 COMPLETE CBC W/AUTO DIFF WBC: CPT

## 2023-01-23 PROCEDURE — C8929 TTE W OR WO FOL WCON,DOPPLER: HCPCS

## 2023-01-23 RX ORDER — GUAIFENESIN 100 MG/5ML
162 LIQUID (ML) ORAL
Status: COMPLETED | OUTPATIENT
Start: 2023-01-23 | End: 2023-01-23

## 2023-01-23 RX ORDER — HEPARIN SODIUM 1000 [USP'U]/ML
INJECTION, SOLUTION INTRAVENOUS; SUBCUTANEOUS AS NEEDED
Status: DISCONTINUED | OUTPATIENT
Start: 2023-01-23 | End: 2023-01-23 | Stop reason: HOSPADM

## 2023-01-23 RX ORDER — ACETAMINOPHEN 325 MG/1
650 TABLET ORAL
Status: DISCONTINUED | OUTPATIENT
Start: 2023-01-23 | End: 2023-01-25 | Stop reason: HOSPADM

## 2023-01-23 RX ORDER — ONDANSETRON 4 MG/1
4 TABLET, ORALLY DISINTEGRATING ORAL
Status: DISCONTINUED | OUTPATIENT
Start: 2023-01-23 | End: 2023-01-25 | Stop reason: HOSPADM

## 2023-01-23 RX ORDER — SODIUM CHLORIDE 0.9 % (FLUSH) 0.9 %
5-40 SYRINGE (ML) INJECTION AS NEEDED
Status: DISCONTINUED | OUTPATIENT
Start: 2023-01-23 | End: 2023-01-25 | Stop reason: HOSPADM

## 2023-01-23 RX ORDER — HEPARIN SODIUM 10000 [USP'U]/100ML
10-25 INJECTION, SOLUTION INTRAVENOUS
Status: DISCONTINUED | OUTPATIENT
Start: 2023-01-23 | End: 2023-01-23

## 2023-01-23 RX ORDER — SODIUM CHLORIDE 0.9 % (FLUSH) 0.9 %
5-40 SYRINGE (ML) INJECTION EVERY 8 HOURS
Status: DISCONTINUED | OUTPATIENT
Start: 2023-01-23 | End: 2023-01-25 | Stop reason: HOSPADM

## 2023-01-23 RX ORDER — LISINOPRIL 5 MG/1
5 TABLET ORAL DAILY
Status: DISCONTINUED | OUTPATIENT
Start: 2023-01-24 | End: 2023-01-24

## 2023-01-23 RX ORDER — HEPARIN SODIUM 200 [USP'U]/100ML
INJECTION, SOLUTION INTRAVENOUS
Status: COMPLETED | OUTPATIENT
Start: 2023-01-23 | End: 2023-01-23

## 2023-01-23 RX ORDER — VERAPAMIL HYDROCHLORIDE 2.5 MG/ML
INJECTION, SOLUTION INTRAVENOUS AS NEEDED
Status: DISCONTINUED | OUTPATIENT
Start: 2023-01-23 | End: 2023-01-23 | Stop reason: HOSPADM

## 2023-01-23 RX ORDER — ONDANSETRON 2 MG/ML
4 INJECTION INTRAMUSCULAR; INTRAVENOUS
Status: DISCONTINUED | OUTPATIENT
Start: 2023-01-23 | End: 2023-01-25 | Stop reason: HOSPADM

## 2023-01-23 RX ORDER — FENTANYL CITRATE 50 UG/ML
50 INJECTION, SOLUTION INTRAMUSCULAR; INTRAVENOUS ONCE
Status: COMPLETED | OUTPATIENT
Start: 2023-01-23 | End: 2023-01-23

## 2023-01-23 RX ORDER — ENOXAPARIN SODIUM 100 MG/ML
1 INJECTION SUBCUTANEOUS
Status: DISCONTINUED | OUTPATIENT
Start: 2023-01-23 | End: 2023-01-23

## 2023-01-23 RX ORDER — MIDAZOLAM HYDROCHLORIDE 1 MG/ML
INJECTION, SOLUTION INTRAMUSCULAR; INTRAVENOUS AS NEEDED
Status: DISCONTINUED | OUTPATIENT
Start: 2023-01-23 | End: 2023-01-23 | Stop reason: HOSPADM

## 2023-01-23 RX ORDER — MORPHINE SULFATE 4 MG/ML
4 INJECTION INTRAVENOUS
Status: DISCONTINUED | OUTPATIENT
Start: 2023-01-23 | End: 2023-01-25 | Stop reason: HOSPADM

## 2023-01-23 RX ORDER — ATORVASTATIN CALCIUM 20 MG/1
20 TABLET, FILM COATED ORAL
Status: DISCONTINUED | OUTPATIENT
Start: 2023-01-23 | End: 2023-01-25 | Stop reason: HOSPADM

## 2023-01-23 RX ORDER — ACETAMINOPHEN 650 MG/1
650 SUPPOSITORY RECTAL
Status: DISCONTINUED | OUTPATIENT
Start: 2023-01-23 | End: 2023-01-25 | Stop reason: HOSPADM

## 2023-01-23 RX ORDER — ONDANSETRON 2 MG/ML
4 INJECTION INTRAMUSCULAR; INTRAVENOUS
Status: COMPLETED | OUTPATIENT
Start: 2023-01-23 | End: 2023-01-23

## 2023-01-23 RX ORDER — POLYETHYLENE GLYCOL 3350 17 G/17G
17 POWDER, FOR SOLUTION ORAL DAILY PRN
Status: DISCONTINUED | OUTPATIENT
Start: 2023-01-23 | End: 2023-01-25 | Stop reason: HOSPADM

## 2023-01-23 RX ORDER — FENTANYL CITRATE 50 UG/ML
INJECTION, SOLUTION INTRAMUSCULAR; INTRAVENOUS AS NEEDED
Status: DISCONTINUED | OUTPATIENT
Start: 2023-01-23 | End: 2023-01-23 | Stop reason: HOSPADM

## 2023-01-23 RX ORDER — LIDOCAINE HYDROCHLORIDE 10 MG/ML
INJECTION INFILTRATION; PERINEURAL AS NEEDED
Status: DISCONTINUED | OUTPATIENT
Start: 2023-01-23 | End: 2023-01-23 | Stop reason: HOSPADM

## 2023-01-23 RX ADMIN — Medication 10 ML: at 15:11

## 2023-01-23 RX ADMIN — HEPARIN SODIUM 10 UNITS/KG/HR: 10000 INJECTION, SOLUTION INTRAVENOUS at 12:08

## 2023-01-23 RX ADMIN — MORPHINE SULFATE 4 MG: 4 INJECTION INTRAVENOUS at 09:00

## 2023-01-23 RX ADMIN — ASPIRIN 162 MG: 81 TABLET, CHEWABLE ORAL at 12:08

## 2023-01-23 RX ADMIN — PERFLUTREN 2 ML: 6.52 INJECTION, SUSPENSION INTRAVENOUS at 20:00

## 2023-01-23 RX ADMIN — FENTANYL CITRATE 50 MCG: 50 INJECTION, SOLUTION INTRAMUSCULAR; INTRAVENOUS at 10:21

## 2023-01-23 RX ADMIN — Medication 10 ML: at 21:46

## 2023-01-23 RX ADMIN — SODIUM CHLORIDE, PRESERVATIVE FREE 10 ML: 5 INJECTION INTRAVENOUS at 21:47

## 2023-01-23 RX ADMIN — ONDANSETRON 4 MG: 2 INJECTION INTRAMUSCULAR; INTRAVENOUS at 09:14

## 2023-01-23 RX ADMIN — ATORVASTATIN CALCIUM 20 MG: 20 TABLET, FILM COATED ORAL at 21:46

## 2023-01-23 RX ADMIN — IOPAMIDOL 100 ML: 755 INJECTION, SOLUTION INTRAVENOUS at 10:32

## 2023-01-23 RX ADMIN — SODIUM CHLORIDE 1000 ML: 9 INJECTION, SOLUTION INTRAVENOUS at 09:20

## 2023-01-23 NOTE — ED PROVIDER NOTES
Chief Complaint   Patient presents with    Syncope    Chest Pain (Angina)     This is a 53-year-old female with hypertension and hyperlipidemia presenting by EMS for severe renal chest pain which she characterizes as a sensation of tightness. She was walking her dog this morning when this happened, she became very short of breath, felt diaphoretic, nauseous and lightheaded and dizzy. She has never had symptoms of this nature in the past before. The pain also radiated to her jaw and to her abdomen, she denies any retrosternal pain. No history of ischemic heart disease to her knowledge. She denies any history of tobacco use, no family history of coronary disease to her knowledge, no history of diabetes. She has never had any kind of provocative testing in the past.  She took aspirin 162 mg but continues to have severe pain. No history of venous thromboembolism, lower extreme pain or edema, or recent surgery or hospitalization. No other systemic complaints. Review of Systems   Constitutional:  Positive for diaphoresis. Negative for fever. Respiratory:  Positive for shortness of breath. Cardiovascular:  Positive for chest pain. Gastrointestinal:  Positive for abdominal pain and nausea. Neurological:  Positive for light-headedness and numbness. Negative for syncope. Past Medical History:   Diagnosis Date    Hx of abnormal Pap smear 2012    HPV Positive       Past Surgical History:   Procedure Laterality Date    HX DILATION AND CURETTAGE      HX HYSTEROSCOPY      HX LAPAROSCOPIC SUPRACERVICAL HYSTERECTOMY  2010    HX MYOMECTOMY           No family history on file.     Social History     Socioeconomic History    Marital status: SINGLE     Spouse name: Not on file    Number of children: Not on file    Years of education: Not on file    Highest education level: Not on file   Occupational History    Not on file   Tobacco Use    Smoking status: Never    Smokeless tobacco: Never   Substance and Sexual Activity    Alcohol use: Yes     Comment: occ    Drug use: No    Sexual activity: Yes   Other Topics Concern    Not on file   Social History Narrative    Not on file     Social Determinants of Health     Financial Resource Strain: Not on file   Food Insecurity: Not on file   Transportation Needs: Not on file   Physical Activity: Not on file   Stress: Not on file   Social Connections: Not on file   Intimate Partner Violence: Not on file   Housing Stability: Not on file         ALLERGIES: Penicillins      Vitals:    01/23/23 0939 01/23/23 0954 01/23/23 1009 01/23/23 1054   BP: (!) 93/59 99/70 116/81 (!) 113/91   Pulse: 67 65 76 86   Resp: 13 10 10 16   Temp:       SpO2: 96% 100% 98% 100%   Weight:       Height:           Physical exam  General:  Awake and alert, NAD  HEENT:  NC/AT, equal pupils  Neck:   Normal inspection, full range of motion  Cardiac:  RRR, no murmurs  Respiratory:  Clear bilaterally, no wheezes, rales, rhonchi  Abdomen:  Soft and nontender, nondistended  Extremities: Warm and well perfused, no peripheral edema, no calf tenderness  Neuro:  Moving all extremities symmetrically without gross motor deficit  Skin:   No rashes, ecchymoses, or pallor      Recent Results (from the past 12 hour(s))   SAMPLES BEING HELD    Collection Time: 01/23/23  8:36 AM   Result Value Ref Range    SAMPLES BEING HELD 1RED,1SST     COMMENT        Add-on orders for these samples will be processed based on acceptable specimen integrity and analyte stability, which may vary by analyte.    CBC WITH AUTOMATED DIFF    Collection Time: 01/23/23  8:36 AM   Result Value Ref Range    WBC 8.0 3.6 - 11.0 K/uL    RBC 4.88 3.80 - 5.20 M/uL    HGB 14.6 11.5 - 16.0 g/dL    HCT 43.5 35.0 - 47.0 %    MCV 89.1 80.0 - 99.0 FL    MCH 29.9 26.0 - 34.0 PG    MCHC 33.6 30.0 - 36.5 g/dL    RDW 12.6 11.5 - 14.5 %    PLATELET 877 400 - 155 K/uL    MPV 11.1 8.9 - 12.9 FL    NRBC 0.0 0  WBC    ABSOLUTE NRBC 0.00 0.00 - 0.01 K/uL    NEUTROPHILS 63 32 - 75 %    LYMPHOCYTES 29 12 - 49 %    MONOCYTES 5 5 - 13 %    EOSINOPHILS 2 0 - 7 %    BASOPHILS 1 0 - 1 %    IMMATURE GRANULOCYTES 0 0.0 - 0.5 %    ABS. NEUTROPHILS 5.0 1.8 - 8.0 K/UL    ABS. LYMPHOCYTES 2.3 0.8 - 3.5 K/UL    ABS. MONOCYTES 0.4 0.0 - 1.0 K/UL    ABS. EOSINOPHILS 0.2 0.0 - 0.4 K/UL    ABS. BASOPHILS 0.1 0.0 - 0.1 K/UL    ABS. IMM. GRANS. 0.0 0.00 - 0.04 K/UL    DF AUTOMATED     METABOLIC PANEL, COMPREHENSIVE    Collection Time: 01/23/23  8:36 AM   Result Value Ref Range    Sodium 139 136 - 145 mmol/L    Potassium 4.2 3.5 - 5.1 mmol/L    Chloride 105 97 - 108 mmol/L    CO2 25 21 - 32 mmol/L    Anion gap 9 5 - 15 mmol/L    Glucose 164 (H) 65 - 100 mg/dL    BUN 20 6 - 20 MG/DL    Creatinine 0.83 0.55 - 1.02 MG/DL    BUN/Creatinine ratio 24 (H) 12 - 20      eGFR >60 >60 ml/min/1.73m2    Calcium 9.5 8.5 - 10.1 MG/DL    Bilirubin, total 0.7 0.2 - 1.0 MG/DL    ALT (SGPT) 44 12 - 78 U/L    AST (SGOT) 21 15 - 37 U/L    Alk. phosphatase 128 (H) 45 - 117 U/L    Protein, total 7.8 6.4 - 8.2 g/dL    Albumin 3.8 3.5 - 5.0 g/dL    Globulin 4.0 2.0 - 4.0 g/dL    A-G Ratio 1.0 (L) 1.1 - 2.2     TROPONIN-HIGH SENSITIVITY    Collection Time: 01/23/23  8:36 AM   Result Value Ref Range    Troponin-High Sensitivity 7 0 - 51 ng/L   MAGNESIUM    Collection Time: 01/23/23  8:36 AM   Result Value Ref Range    Magnesium 2.3 1.6 - 2.4 mg/dL   PROTHROMBIN TIME + INR    Collection Time: 01/23/23  8:36 AM   Result Value Ref Range    INR 1.0 0.9 - 1.1      Prothrombin time 10.3 9.0 - 11.1 sec   D DIMER    Collection Time: 01/23/23  8:36 AM   Result Value Ref Range    D-dimer 0.19 0.00 - 0.65 mg/L FEU   TROPONIN-HIGH SENSITIVITY    Collection Time: 01/23/23 10:18 AM   Result Value Ref Range    Troponin-High Sensitivity 1,597 (HH) 0 - 51 ng/L      XR CHEST PORT    Result Date: 1/23/2023  No acute process on portable chest.     CTA CHEST ABD PELV W CONT    Result Date: 1/23/2023  1. No aortic dissection.  2. 2.8 cm x 1.1 cm right renal angiomyolipoma, unchanged. Critical Care  Performed by: Kory Shook MD  Authorized by: Kory Shook MD     Critical care provider statement:     Critical care time (minutes):  30    Critical care time was exclusive of:  Separately billable procedures and treating other patients    Critical care was necessary to treat or prevent imminent or life-threatening deterioration of the following conditions:  Cardiac failure    Critical care was time spent personally by me on the following activities:  Discussions with consultants, evaluation of patient's response to treatment, examination of patient, ordering and performing treatments and interventions, ordering and review of laboratory studies, ordering and review of radiographic studies, pulse oximetry and re-evaluation of patient's condition     EKG as interpreted by me: Initial EKG performed at 08 21 indicates normal sinus rhythm at a rate of 70, normal axis and intervals, there is a 0.5 mm ST elevation in lead V2 but this does not meet STEMI criteria, there are no reciprocal changes, I do not see any ischemic changes in any other leads. Repeat EKG at 09 54 indicates normal sinus rhythm at a rate of 62, normal axis, normal intervals, T wave inversions in V2, again this does not meet STEMI criteria. ED course: Labs, EKG and imaging reviewed. Repeat troponin markedly elevated to 1600. Prior to the second troponin resulting I ordered a CTA chest/abdomen/pelvis to evaluate for aortic dissection given reported chest pain radiating to the abdomen and continued pain despite a non ischemic EKG and normal initial troponin. 12-lead repeated again without any ischemic change. Administered additional aspirin, IV fluids, morphine and Zofran but with continued chest pain. Discussed with cardiology (Portia) at [phone] who advised administering a bolus dose of heparin, in the setting of continued active chest pain.   Will admit for continued management, discussed with the hospitalist.    Hospitalist Perfect Serve for Admission  11:10 AM    ED Room Number:   ER17/17  Patient Name and age:  Deisi Macias 56 y.o.  female  Working Diagnosis:     1. NSTEMI (non-ST elevated myocardial infarction) ([facility] [address]      COVID suspicion:   no  Code Status:    Full Code  Readmission:    no  Isolation Requirements:  no  Recommended Level of Care: telemetry  Department:    [facility] ED - [phone]  Other:     Discussed with cardiology (Portia), will evaluate for cardiac catheterization.

## 2023-01-23 NOTE — PROCEDURES
BRIEF PROCEDURE NOTE    Date of Procedure: 1/23/2023   Preoperative Diagnosis: nstemi  Postoperative Diagnosis: MINOCA    Procedure: Left heart cath, coronary angiography, moderate sedation  Interventional Cardiologist: Britt Chua DO  Assistant: None  Anesthesia: local + IV moderate sedation   I administered moderate sedation throughout this procedure. An independent trained observer pushed medications at my direction, and monitored the patients level of consciousness and physiological status throughout. Estimated Blood Loss: Minimal    Access: right radial artery, 6F  Catheters:  Left coronary:jl 3.5, 5f  Right coronary: jr4, 5f    Findings:   L Main: large caliber vessel, angioraphically normal  LAD: large caliber, two moderate diagonals, minimal disease, S1 with hazy 80-90% stenosis (probable culprit for AMI)  LCx: large caliber, continues as large marginal with distal branching, angiographically normal  RCA: large caliber, moderate to large pda and large pl branches, angiographically normal    LVEDP:  20  mmhg        Specimens Removed: None    Implants: n/a    Closure Device: radial TR band    See full cath note. Complications: none      Findings:  1. Angiographically normal coronary arteries with probable acute occlusion of septal (possible scad) as etiology of AMI  2.  Elevated lvedp    Plan:    DAPT    Britt Chua DO        [address], DO  [facility] [address] [place] [address]                                              Office [phone],[facility] [phone]

## 2023-01-23 NOTE — H&P
[address]  [phone]    Hospitalist Admission Note      NAME:  Rey Lombardi   :   1965   MRN:  [id number]     PCP:  Miek Laureano MD     Date/Time of service:  2023 1:28 PM          Subjective:     CHIEF COMPLAINT: chest pain     HISTORY OF PRESENT ILLNESS:     Ms. Jem Ramirez is a 56 y.o.  female who presented to the Emergency Department complaining of chest pain. Patient is alert recommended x3. As per the patient she was walking her dog this morning around 7 AM.  She started feeling very short of breath. She had a pressure-like pain almost 9 out of 10 in intensity starting from her neck down to her epigastric area. She felt dizzy and nauseated. She denies any vomiting. She came to the ER and was found to have elevated troponin. She is still has a little pressure in the chest.  Her initial troponin were 7 and repeat troponin went up to 1597. She was started on heparin drip. She took 2 aspirins prior to coming to the hospital.  She watched on television program where they recommended taking aspirin so she took it. Past Medical History:   Diagnosis Date    Hx of abnormal Pap smear     HPV Positive        Past Surgical History:   Procedure Laterality Date    HX DILATION AND CURETTAGE      HX HYSTEROSCOPY      HX LAPAROSCOPIC SUPRACERVICAL HYSTERECTOMY      HX MYOMECTOMY         Social History     Tobacco Use    Smoking status: Never    Smokeless tobacco: Never   Substance Use Topics    Alcohol use: Yes     Comment: occ      FH- Father with heart problems. Allergies   Allergen Reactions    Penicillins Other (comments)     Syncope    Other reaction(s): Other (See Comments)        Prior to Admission medications    Medication Sig Start Date End Date Taking?  Authorizing Provider   atorvastatin (LIPITOR) 20 mg tablet TAKE 1 TABLET BY MOUTH EVERYDAY AT BEDTIME 20  Yes Provider, Historical valsartan (DIOVAN) 80 mg tablet  4/7/20  Yes Provider, Historical   predniSONE (STERAPRED DS) 10 mg dose pack Per pack instructions  Patient not taking: Reported on 1/23/2023 9/6/22   Elyssa Vora MD   estradioL (ESTRACE) 0.01 % (0.1 mg/gram) vaginal cream Insert 2 g into vagina in the morning. Patient not taking: Reported on 1/23/2023    Provider, Historical   nystatin (MYCOSTATIN) topical cream Apply  to affected area two (2) times a day. Patient not taking: Reported on 1/23/2023    Provider, Historical   naproxen (NAPROSYN) 500 mg tablet TAKE 1 TABLET BY MOUTH TWICE A DAY  Patient not taking: Reported on 1/23/2023 4/15/20   Provider, Historical   triamcinolone acetonide (KENALOG) 0.1 % ointment  4/23/20   Provider, Historical   lisinopril (PRINIVIL, ZESTRIL) 10 mg tablet  1/3/19   Provider, Historical   clindamycin (CLEOCIN T) 1 % lotion APPLY TO AFFECTED AREA TWICE A DAY  Patient not taking: Reported on 1/23/2023 9/18/18   Provider, Historical   naproxen sodium (NAPROSYN) 220 mg tablet Take 220 mg by mouth two (2) times daily (with meals).   Patient not taking: Reported on 1/23/2023    Provider, Historical       Review of Systems:  (bold if positive, if negative)    Gen:  Eyes:  ENT:  CVS:  Pulm: Shortness of breath positive GI: Nausea positive :  MS:  Skin:  Psych:  Endo:  Hem:  Renal:  Neuro:        Objective:      VITALS:    Vital signs reviewed; most recent are:    Visit Vitals  BP (!) 124/95   Pulse 89   Temp 98 °F (36.7 °C)   Resp 25   Ht 5' 7\" (1.702 m)   Wt 93 kg (205 lb)   SpO2 97%   BMI 32.11 kg/m²     SpO2 Readings from Last 6 Encounters:   01/23/23 97%   09/06/22 96%   03/28/22 96%   07/16/18 97%   05/12/11 95%          Intake/Output Summary (Last 24 hours) at 1/23/2023 1420  Last data filed at 1/23/2023 1020  Gross per 24 hour   Intake 1000 ml   Output --   Net 1000 ml        Exam:     Physical Exam:    Gen:  Well-developed, well-nourished, in no acute distress  HEENT:  Pink conjunctivae, PERRL, hearing intact to voice, moist mucous membranes  Neck:  Supple, without masses, thyroid non-tender  Resp:  No accessory muscle use, clear breath sounds without wheezes rales or rhonchi  Card:  No murmurs, normal S1, S2 without thrills, bruits or peripheral edema  Abd:  Soft, non-tender, non-distended, normoactive bowel sounds are present, no mass  Lymph:  No cervical or inguinal adenopathy  Musc:  No cyanosis or clubbing  Skin:  No rashes or ulcers, skin turgor is good  Neuro:  Cranial nerves are grossly intact, no focal motor weakness, follows commands appropriately  Psych:  Good insight, oriented to person, place and time, alert     Labs:    Recent Labs     01/23/23  0836   WBC 8.0   HGB 14.6   HCT 43.5        Recent Labs     01/23/23  0836      K 4.2      CO2 25   *   BUN 20   CREA 0.83   CA 9.5   MG 2.3   ALB 3.8   TBILI 0.7   ALT 44     Lab Results   Component Value Date/Time    Glucose (POC) 89 08/16/2010 09:13 AM    Glucose (POC) 94 04/26/2010 07:12 AM     No results for input(s): PH, PCO2, PO2, HCO3, FIO2 in the last 72 hours. Recent Labs     01/23/23  0836   INR 1.0     All Micro Results       None            I have reviewed previous records       Assessment and Plan:      Principal Problem:    NSTEMI (non-ST elevated myocardial infarction) ([facility] [address] (1/23/2023)    Active Problems:    HTN (hypertension) (1/23/2023)      Hyperlipemia (1/23/2023)     Plan:    1. NSTEMI-patient will be kept n.p.o., IV heparin, cardiology consult. 2.  Hyperlipidemia-continue statins. 3.  Hypertension- on lisinopril. 4. DVT prophylaxis- on heparin gtt. Patient is a full code. Telemetry reviewed:   normal sinus rhythm    Risk of deterioration: medium      Total time spent with patient: 79 Minutes I personally reviewed chart, notes, data and current medications in the medical record. I have personally examined and treated the patient at bedside during this period.  To assist coordination of care and communication with nursing and staff, this note may be preliminary early in the day, but finalized by end of the day.                  Care Plan discussed with: Patient    Discussed:  Code Status and Care Plan       ___________________________________________________    Attending Physician: Larissa Drummond MD

## 2023-01-23 NOTE — ED NOTES
From: Esther Collier  To: Naheed Hernandez MD  Sent: 7/8/2020 12:07 PM CDT  Subject: Non-Urgent Medical Question    Hi Dr. Hernandez, is there any information in my medical records on my blood type? When I had surgery back in 2012 I assumed they blood typed me before hand. Let me know, thanks!   TRANSFER - OUT REPORT:    Verbal report given to Tico RN (name) on Bernard Johnson  being transferred to [facility] (unit) for routine progression of care       Report consisted of patients Situation, Background, Assessment and   Recommendations(SBAR). Information from the following report(s) SBAR, Kardex, and ED Summary was reviewed with the receiving nurse. Lines:   Peripheral IV 01/23/23 Left Antecubital (Active)        Opportunity for questions and clarification was provided.       Patient transported with:   Monitor  Registered Nurse  Tech

## 2023-01-23 NOTE — Clinical Note
Khris 51 RN IN CLPO UPDATED ON PTS STATUS IN LAB.  VERBAL BEDSIDE REPORT TO BE GIVEN IN CLPO TO RECEIVING RN

## 2023-01-23 NOTE — CARDIO/PULMONARY
[facility] Cardiopulmonary Rehab  OPCR chart review    57 yo female from [place], [place] presented with CP/NSTEMI. PMH includes HTN, HLD, obesity, sedentary lifestyle, postmenopausal.    Cardiac cath 1/23/23 revealed minimal disease in LAD system with hazy 80-90% stenosis of septal. Other arteries described as angiographically normal per cath report. Possible SCAD/MINOCA. NSTEMI patient meets criteria for OPCR. Educational handouts provided on NSTEMI, CAD, CAD risk factors, heart healthy eating and community resources. Reference information on [facility] program also provided. Met with pt at bedside post cath. Discussed cath findings and diagnoses. Discussed potential benefits of OPCR to assist with risk factor modification. At this time, pt is declining participation but is open to a phone call next week to see if she would like to reconsider participation. [facility] cardiac rehab staff will contact patient via telephone call next week.

## 2023-01-23 NOTE — PROGRESS NOTES
2:40 PM  TRANSFER - IN REPORT:    Verbal report received from Ed RN(name) on [place]  being received from cath lab(unit) for routine post - op      Report consisted of patients Situation, Background, Assessment and   Recommendations(SBAR). Information from the following report(s) SBAR was reviewed with the receiving nurse. Opportunity for questions and clarification was provided. Assessment completed upon patients arrival to unit and care assumed. 3:45 PM  3 ml air released from TR Band. No bleeding or hematoma noted. Radial and Ulnar pulse on right wrist palpable. Pt tolerated well. Will continue to monitor. 3:50 PM  3 ml air released from TR Band. No bleeding or hematoma noted. Radial and Ulnar pulse on right wrist palpable. Pt tolerated well. Will continue to monitor. 3:55 PM  3 ml air released from TR Band. No bleeding or hematoma noted. Radial and Ulnar pulse on right wrist palpable. Pt tolerated well. Will continue to monitor. 3:56 PM  Air release completed. TR Band removed from right wrist. No bleeding or  Hematoma. Dressing applied. Wrist immobilizer in place. Radial and ulnar pulse remain palpable on affected extremity. Pt tolerated well. Instructions given to pt regarding movement and activity restrictions. Pt voiced understanding. 5:18 PM  30 minute call given to [facility].     6:00 PM  Site check performed with Jodee Rushing RN.

## 2023-01-23 NOTE — ED TRIAGE NOTES
Pt arrived via EMS for near syncopal episode and chest pain. Pt states she was walking her dog with sudden onset of chest pressure from her mid sternum to her jaw and in both arms with numbness and tingling in hands. States she felt like she was going to pass out when she decided to come to ED. Medical Hx includes Hysterotomy and HTN. Pt a/o x4 VS stable.  Took two 81 mg ASA prior to arrival. Pt's pain 8/10

## 2023-01-23 NOTE — CONSULTS
[facility]                                                          Cardiology Care Note               Donta Alexander MD, [address], [place], [address] [facility]             Phone [phone]; Fax [phone]           Mobile [phone]   Voice Mail [phone]     [x]Initial Encounter     []Follow-up    Patient Name: Brooke Li - HSV:[id number] - [id number]  Primary Cardiologist: [facility] Physicians: none  Consulting Cardiologist: [facility] Physicians: Brooke Hernandez MD     Reason for encounter:    HPI:       Brooke Li is a 56 y.o. female with PMH significant for HTN and dyslipidemia    Subjective:      Brooke Li reports chest pain. she developed a sudden onset of chest pain from neck to abdomen. She had SOB and diaphoretic. She was going up a slight hill. Felt nausea and as though she was going to pass out. Never had similar sx's. She works for [employer]. No similar sxs. No nocturnal or postprandial pain. Cardiac risk factors: dyslipidemia, obesity, sedentary life style, hypertension, post-menopausal.     Assessment and Plan     1) Chest discomfort  -initial trop negative followed by significant bump  -CT negative for dissection and D dimer not impressive for PE  -persistent pain will go to the cath lab around 2:15 pm  -ECG negative  -consider posterior leads  -Risk and benefits of cardiac catheterization reviewed with the patient . I reviewed he risks (including but not limited to death, myocardial infarction, cerebrovascular accident, dysrhythmia, renal failure, vascular complication, allergy, and/or need for emergency surgery), benefits, and alternatives have been explained. The patient wishes to proceed and is an ASA 2 and Airway 2  Verbal consent has been obtained.   -Heparin gtt with bolus  -aspirin  2) check FLP  -LDL goal less than 100 for now  3) HTN  4) Obesity BMI 32           ____________________________________________________________    Cardiac testing    none            Most recent HS troponins:  Recent Labs     01/23/23  1018 01/23/23  0836   TROPHS 1,597* 7       ECG: reported by ER physician to be normal. Not scanned in computer and unable to find. Review of Systems:    [x]All other systems reviewed and all negative except as written in HPI    [] Patient unable to provide secondary to condition    Past Medical History:   Diagnosis Date    Hx of abnormal Pap smear 2012    HPV Positive     Past Surgical History:   Procedure Laterality Date    HX DILATION AND CURETTAGE      HX HYSTEROSCOPY      HX LAPAROSCOPIC SUPRACERVICAL HYSTERECTOMY  2010    HX MYOMECTOMY       Social Hx:  reports that she has never smoked. She has never used smokeless tobacco. She reports current alcohol use. She reports that she does not use drugs. Family Hx: family history is not on file. Allergies   Allergen Reactions    Penicillins Other (comments)     Syncope    Other reaction(s):  Other (See Comments)          OBJECTIVE:  Wt Readings from Last 3 Encounters:   01/23/23 205 lb (93 kg)   09/06/22 200 lb (90.7 kg)   07/26/22 208 lb 3.2 oz (94.4 kg)     No intake or output data in the 24 hours ending 01/23/23 1131    Physical Exam:    Vitals:   Vitals:    01/23/23 0939 01/23/23 0954 01/23/23 1009 01/23/23 1054   BP: (!) 93/59 99/70 116/81 (!) 113/91   Pulse: 67 65 76 86   Resp: 13 10 10 16   Temp:       SpO2: 96% 100% 98% 100%   Weight:       Height: Gen: Well-developed, well-nourished, in no acute distress,overweight  Neck: Supple, No JVD, No Carotid Bruit  Resp: No accessory muscle use, Clear breath sounds, No rales or rhonchi  Card: Regular Rate,Rythm, Normal S1, S2, No murmurs, rubs or gallop. No thrills. Abd:   Soft, non-tender, non-distended, BS+   MSK: No cyanosis  Skin: No rashes    Neuro: Moving all four extremities, follows commands appropriately  Psych: Good insight, oriented to person, place, alert, Nml Affect  LE: No edema    No specialty comments available. Data Review:     Radiology:   XR Results (most recent):  Results from Hospital Encounter encounter on 01/23/23    XR CHEST PORT    Narrative  EXAM:  XR CHEST PORT    INDICATION: Chest pain    COMPARISON: 8/16/2010    TECHNIQUE: portable chest AP view    FINDINGS: The cardiac silhouette is within normal limits. The pulmonary  vasculature is within normal limits. The lungs and pleural spaces are clear. The visualized bones and upper abdomen  are age-appropriate. Impression  No acute process on portable chest.      Recent Labs     01/23/23  0836      K 4.2      CO2 25   BUN 20   CREA 0.83   *   CA 9.5     Recent Labs     01/23/23  0836   WBC 8.0   HGB 14.6   HCT 43.5        Recent Labs     01/23/23  0836   PTP 10.3   INR 1.0   *     No results for input(s): CHOL, LDLC in the last 72 hours.     No lab exists for component: TGL, HDLC,  HBA1C      Current meds:    Current Facility-Administered Medications:     morphine injection 4 mg, 4 mg, IntraVENous, Q30MIN PRN, Shagufta Valdez MD, 4 mg at 01/23/23 0900    aspirin chewable tablet 162 mg, 162 mg, Oral, NOW, Courtney Anne Gracia MD    heparin 25,000 units in D5W 250 ml infusion, 10-25 Units/kg/hr, IntraVENous, TITRATE, Michelle Sanabria NP    Current Outpatient Medications:     atorvastatin (LIPITOR) 20 mg tablet, TAKE 1 TABLET BY MOUTH EVERYDAY AT BEDTIME, Disp: , Rfl:     valsartan (DIOVAN) 80 mg tablet, , Disp: , Rfl:     predniSONE (STERAPRED DS) 10 mg dose pack, Per pack instructions (Patient not taking: Reported on 1/23/2023), Disp: 21 Tablet, Rfl: 0    estradioL (ESTRACE) 0.01 % (0.1 mg/gram) vaginal cream, Insert 2 g into vagina in the morning. (Patient not taking: Reported on 1/23/2023), Disp: , Rfl:     nystatin (MYCOSTATIN) topical cream, Apply  to affected area two (2) times a day. (Patient not taking: Reported on 1/23/2023), Disp: , Rfl:     naproxen (NAPROSYN) 500 mg tablet, TAKE 1 TABLET BY MOUTH TWICE A DAY (Patient not taking: Reported on 1/23/2023), Disp: , Rfl:     triamcinolone acetonide (KENALOG) 0.1 % ointment, , Disp: , Rfl:     lisinopril (PRINIVIL, ZESTRIL) 10 mg tablet, , Disp: , Rfl:     clindamycin (CLEOCIN T) 1 % lotion, APPLY TO AFFECTED AREA TWICE A DAY (Patient not taking: Reported on 1/23/2023), Disp: , Rfl: 0    naproxen sodium (NAPROSYN) 220 mg tablet, Take 220 mg by mouth two (2) times daily (with meals).  (Patient not taking: Reported on 1/23/2023), Disp: , Rfl:     Bernardo Pride MD    [facility] Cardiology  Call center: (M) [phone]  (N) [phone]      Carly Kaiser MD

## 2023-01-24 LAB
ANION GAP SERPL CALC-SCNC: 12 MMOL/L (ref 5–15)
BASOPHILS # BLD: 0 K/UL (ref 0–0.1)
BASOPHILS NFR BLD: 0 % (ref 0–1)
BUN SERPL-MCNC: 15 MG/DL (ref 6–20)
BUN/CREAT SERPL: 20 (ref 12–20)
CALCIUM SERPL-MCNC: 8.8 MG/DL (ref 8.5–10.1)
CHLORIDE SERPL-SCNC: 108 MMOL/L (ref 97–108)
CHOLEST SERPL-MCNC: 133 MG/DL
CO2 SERPL-SCNC: 18 MMOL/L (ref 21–32)
CREAT SERPL-MCNC: 0.74 MG/DL (ref 0.55–1.02)
DIFFERENTIAL METHOD BLD: ABNORMAL
ECHO AV AREA PEAK VELOCITY: 2.9 CM2
ECHO AV AREA/BSA PEAK VELOCITY: 1.4 CM2/M2
ECHO AV PEAK GRADIENT: 6 MMHG
ECHO AV PEAK VELOCITY: 1.3 M/S
ECHO AV VELOCITY RATIO: 0.77
ECHO LA DIAMETER INDEX: 1.62 CM/M2
ECHO LA DIAMETER: 3.3 CM
ECHO LA VOL 2C: 41 ML (ref 22–52)
ECHO LA VOL 4C: 30 ML (ref 22–52)
ECHO LA VOL BP: 36 ML (ref 22–52)
ECHO LA VOL/BSA BIPLANE: 18 ML/M2 (ref 16–34)
ECHO LA VOLUME AREA LENGTH: 43 ML
ECHO LA VOLUME INDEX A2C: 20 ML/M2 (ref 16–34)
ECHO LA VOLUME INDEX A4C: 15 ML/M2 (ref 16–34)
ECHO LA VOLUME INDEX AREA LENGTH: 21 ML/M2 (ref 16–34)
ECHO LV E' LATERAL VELOCITY: 12 CM/S
ECHO LV E' SEPTAL VELOCITY: 10 CM/S
ECHO LV FRACTIONAL SHORTENING: 37 % (ref 28–44)
ECHO LV INTERNAL DIMENSION DIASTOLE INDEX: 2.65 CM/M2
ECHO LV INTERNAL DIMENSION DIASTOLIC: 5.4 CM (ref 3.9–5.3)
ECHO LV INTERNAL DIMENSION SYSTOLIC INDEX: 1.67 CM/M2
ECHO LV INTERNAL DIMENSION SYSTOLIC: 3.4 CM
ECHO LV IVSD: 1 CM (ref 0.6–0.9)
ECHO LV MASS 2D: 206.7 G (ref 67–162)
ECHO LV MASS INDEX 2D: 101.3 G/M2 (ref 43–95)
ECHO LV POSTERIOR WALL DIASTOLIC: 1 CM (ref 0.6–0.9)
ECHO LV RELATIVE WALL THICKNESS RATIO: 0.37
ECHO LVOT AREA: 3.8 CM2
ECHO LVOT DIAM: 2.2 CM
ECHO LVOT PEAK GRADIENT: 4 MMHG
ECHO LVOT PEAK VELOCITY: 1 M/S
ECHO MV A VELOCITY: 0.77 M/S
ECHO MV E DECELERATION TIME (DT): 155.5 MS
ECHO MV E VELOCITY: 0.75 M/S
ECHO MV E/A RATIO: 0.97
ECHO MV E/E' LATERAL: 6.25
ECHO MV E/E' RATIO (AVERAGED): 6.88
ECHO MV E/E' SEPTAL: 7.5
ECHO RV FREE WALL PEAK S': 12 CM/S
ECHO RV INTERNAL DIMENSION: 3.3 CM
ECHO RV TAPSE: 2.3 CM (ref 1.7–?)
ECHO RVOT PEAK GRADIENT: 2 MMHG
ECHO RVOT PEAK VELOCITY: 0.7 M/S
ECHO TV REGURGITANT MAX VELOCITY: 1.99 M/S
ECHO TV REGURGITANT PEAK GRADIENT: 16 MMHG
EOSINOPHIL # BLD: 0.2 K/UL (ref 0–0.4)
EOSINOPHIL NFR BLD: 2 % (ref 0–7)
ERYTHROCYTE [DISTWIDTH] IN BLOOD BY AUTOMATED COUNT: 12.5 % (ref 11.5–14.5)
EST. AVERAGE GLUCOSE BLD GHB EST-MCNC: 131 MG/DL
GLUCOSE SERPL-MCNC: 147 MG/DL (ref 65–100)
HBA1C MFR BLD: 6.2 % (ref 4–5.6)
HCT VFR BLD AUTO: 42.3 % (ref 35–47)
HDLC SERPL-MCNC: 48 MG/DL
HDLC SERPL: 2.8 (ref 0–5)
HGB BLD-MCNC: 14 G/DL (ref 11.5–16)
IMM GRANULOCYTES # BLD AUTO: 0 K/UL (ref 0–0.04)
IMM GRANULOCYTES NFR BLD AUTO: 0 % (ref 0–0.5)
LDLC SERPL CALC-MCNC: 60 MG/DL (ref 0–100)
LYMPHOCYTES # BLD: 0.9 K/UL (ref 0.8–3.5)
LYMPHOCYTES NFR BLD: 10 % (ref 12–49)
MCH RBC QN AUTO: 29.5 PG (ref 26–34)
MCHC RBC AUTO-ENTMCNC: 33.1 G/DL (ref 30–36.5)
MCV RBC AUTO: 89.2 FL (ref 80–99)
MONOCYTES # BLD: 0.2 K/UL (ref 0–1)
MONOCYTES NFR BLD: 2 % (ref 5–13)
NEUTS SEG # BLD: 7.3 K/UL (ref 1.8–8)
NEUTS SEG NFR BLD: 86 % (ref 32–75)
NRBC # BLD: 0 K/UL (ref 0–0.01)
NRBC BLD-RTO: 0 PER 100 WBC
PLATELET # BLD AUTO: 248 K/UL (ref 150–400)
PMV BLD AUTO: 11.1 FL (ref 8.9–12.9)
POTASSIUM SERPL-SCNC: 4.4 MMOL/L (ref 3.5–5.1)
RBC # BLD AUTO: 4.74 M/UL (ref 3.8–5.2)
SODIUM SERPL-SCNC: 138 MMOL/L (ref 136–145)
TRIGL SERPL-MCNC: 125 MG/DL (ref ?–150)
TROPONIN-HIGH SENSITIVITY: ABNORMAL NG/L (ref 0–51)
VLDLC SERPL CALC-MCNC: 25 MG/DL
WBC # BLD AUTO: 8.6 K/UL (ref 3.6–11)

## 2023-01-24 PROCEDURE — 36415 COLL VENOUS BLD VENIPUNCTURE: CPT

## 2023-01-24 PROCEDURE — 93306 TTE W/DOPPLER COMPLETE: CPT | Performed by: SPECIALIST

## 2023-01-24 PROCEDURE — 65270000046 HC RM TELEMETRY

## 2023-01-24 PROCEDURE — 83036 HEMOGLOBIN GLYCOSYLATED A1C: CPT

## 2023-01-24 PROCEDURE — 99232 SBSQ HOSP IP/OBS MODERATE 35: CPT | Performed by: SPECIALIST

## 2023-01-24 PROCEDURE — 84484 ASSAY OF TROPONIN QUANT: CPT

## 2023-01-24 PROCEDURE — 80061 LIPID PANEL: CPT

## 2023-01-24 PROCEDURE — 85025 COMPLETE CBC W/AUTO DIFF WBC: CPT

## 2023-01-24 PROCEDURE — 74011250637 HC RX REV CODE- 250/637: Performed by: NURSE PRACTITIONER

## 2023-01-24 PROCEDURE — 80048 BASIC METABOLIC PNL TOTAL CA: CPT

## 2023-01-24 PROCEDURE — APPSS30 APP SPLIT SHARED TIME 16-30 MINUTES: Performed by: NURSE PRACTITIONER

## 2023-01-24 PROCEDURE — 74011250637 HC RX REV CODE- 250/637: Performed by: HOSPITALIST

## 2023-01-24 RX ORDER — METOPROLOL TARTRATE 25 MG/1
12.5 TABLET, FILM COATED ORAL EVERY 12 HOURS
Status: DISCONTINUED | OUTPATIENT
Start: 2023-01-24 | End: 2023-01-25 | Stop reason: HOSPADM

## 2023-01-24 RX ORDER — CLOPIDOGREL BISULFATE 75 MG/1
75 TABLET ORAL DAILY
Status: DISCONTINUED | OUTPATIENT
Start: 2023-01-24 | End: 2023-01-25 | Stop reason: HOSPADM

## 2023-01-24 RX ORDER — METOPROLOL TARTRATE 25 MG/1
25 TABLET, FILM COATED ORAL EVERY 12 HOURS
Status: DISCONTINUED | OUTPATIENT
Start: 2023-01-24 | End: 2023-01-24

## 2023-01-24 RX ORDER — GUAIFENESIN 100 MG/5ML
81 LIQUID (ML) ORAL DAILY
Status: DISCONTINUED | OUTPATIENT
Start: 2023-01-24 | End: 2023-01-25 | Stop reason: HOSPADM

## 2023-01-24 RX ADMIN — POLYETHYLENE GLYCOL 3350 17 G: 17 POWDER, FOR SOLUTION ORAL at 18:51

## 2023-01-24 RX ADMIN — METOPROLOL TARTRATE 12.5 MG: 25 TABLET, FILM COATED ORAL at 08:59

## 2023-01-24 RX ADMIN — ASPIRIN 81 MG: 81 TABLET, CHEWABLE ORAL at 08:57

## 2023-01-24 RX ADMIN — METOPROLOL TARTRATE 12.5 MG: 25 TABLET, FILM COATED ORAL at 22:40

## 2023-01-24 RX ADMIN — ATORVASTATIN CALCIUM 20 MG: 20 TABLET, FILM COATED ORAL at 22:40

## 2023-01-24 RX ADMIN — CLOPIDOGREL BISULFATE 75 MG: 75 TABLET ORAL at 08:56

## 2023-01-24 NOTE — PROGRESS NOTES
[facility]                                                          Cardiology Care Note               Donta Bryan MD, [address], [place], [address] [facility]             Phone [phone]; Fax [phone]           Mobile [phone]   Voice Mail [phone]     []Initial Encounter     [x]Follow-up    Patient Name: Prasad Stroud - ZTX:[id number] - [id number]  Primary Cardiologist: [facility] Phys[facility]: none  Consulting Cardiologist: [facility] Phys[facility]: Edwin Hylton MD     Reason for encounter: NSTEMI    HPI:       Prasad Stroud is a 56 y.o. female with PMH significant for HTN and dyslipidemia. She developed a sudden onset of chest pain from neck to abdomen. She had SOB and diaphoretic. She was going up a slight hill. Felt nausea and as though she was going to pass out. Never had similar sx's. She works for [employer]. No similar sxs. No nocturnal or postprandial pain. Subjective:      Prasad Stroud reports cont chest pressure but improved.     Cardiac risk factors: dyslipidemia, obesity, sedentary life style, hypertension, post-menopausal.     Assessment and Plan     1) Chest discomfort  -initial trop negative followed by significant bump  -CT negative for dissection and D dimer not impressive for PE  -persistent pain will go to the cath lab around 2:15 pm  -ECG negative  - s/p cath 1/23/23 w/ probable acute occlusion of septal/poss SCAD, start DAPT  - start low dose metoprolol, consider Imdur if BP improves   - TTE pending     2) Dyslipidemia  - check lipid panel  - cont lipitor for now    3) HTN  - BP low    4) Obesity BMI 32           ____________________________________________________________    Cardiac testing    01/23/23    CARDIAC PROCEDURE 01/23/2023 1/23/2023    Conclusion  · Angiographically normal coronary arteries with probable acute occlusion of septal (possible scad) as etiology of AMI  · Elevated lvedp      Findings:  L Main: large caliber vessel, angioraphically normal  LAD: large caliber, two moderate diagonals, minimal disease, S1 with hazy 80-90% stenosis (probable culprit for AMI)  LCx: large caliber, continues as large marginal with distal branching, angiographically normal  RCA: large caliber, moderate to large pda and large pl branches, angiographically normal    LVEDP:  20  mmhg    Signed by: Oliver Small DO on 1/23/2023  2:50 PM      Most recent HS troponins:  Recent Labs     01/24/23  0606 01/23/23  1018 01/23/23  0836   TROPHS 12,171* 1,597* 7       ECG: reported by ER physician to be normal. Not scanned in computer and unable to find. Review of Systems:    [x]All other systems reviewed and all negative except as written in HPI    [] Patient unable to provide secondary to condition    Past Medical History:   Diagnosis Date    Hx of abnormal Pap smear 2012    HPV Positive     Past Surgical History:   Procedure Laterality Date    HX DILATION AND CURETTAGE      HX HYSTEROSCOPY      HX LAPAROSCOPIC SUPRACERVICAL HYSTERECTOMY  2010    HX MYOMECTOMY       Social Hx:  reports that she has never smoked. She has never used smokeless tobacco. She reports current alcohol use. She reports that she does not use drugs. Family Hx: family history is not on file. Allergies   Allergen Reactions    Penicillins Other (comments)     Syncope    Other reaction(s):  Other (See Comments)          OBJECTIVE:  Wt Readings from Last 3 Encounters:   01/23/23 93 kg (205 lb)   09/06/22 90.7 kg (200 lb)   07/26/22 94.4 kg (208 lb 3.2 oz)       Intake/Output Summary (Last 24 hours) at 1/24/2023 0857  Last data filed at 1/23/2023 2342  Gross per 24 hour   Intake 1300 ml   Output --   Net 1300 ml       Physical Exam:    Vitals:   Vitals:    01/23/23 2301 01/23/23 2341 01/24/23 0328 01/24/23 0812   BP:  128/73 99/70 107/73   Pulse: 92 96 95 83   Resp:  10 15 13   Temp:  97.8 °F (36.6 °C) 98.2 °F (36.8 °C) 99 °F (37.2 °C)   SpO2:  97% 97% 99%   Weight:       Height:                                                                                                                                                                                                                                                                                                                                                                                                                                                                                                                                                                                                                                                                                                              Gen: Well-developed, well-nourished, in no acute distress,overweight  Neck: Supple, No JVD, No Carotid Bruit  Resp: No accessory muscle use, Clear breath sounds, No rales or rhonchi  Card: Regular Rate,Rythm, Normal S1, S2, No murmurs, rubs or gallop. No thrills. Abd:   Soft, non-tender, non-distended, BS+   MSK: No cyanosis  Skin: No rashes    Neuro: Moving all four extremities, follows commands appropriately  Psych: Good insight, oriented to person, place, alert, Nml Affect  LE: No edema    No specialty comments available. Data Review:     Radiology:   XR Results (most recent):  Results from Hospital Encounter encounter on 01/23/23    XR CHEST PORT    Narrative  EXAM:  XR CHEST PORT    INDICATION: Chest pain    COMPARISON: 8/16/2010    TECHNIQUE: portable chest AP view    FINDINGS: The cardiac silhouette is within normal limits. The pulmonary  vasculature is within normal limits. The lungs and pleural spaces are clear. The visualized bones and upper abdomen  are age-appropriate.     Impression  No acute process on portable chest.      Recent Labs     01/24/23  0606 01/23/23  0836    139   K 4.4 4.2    105   CO2 18* 25   BUN 15 20   CREA 0.74 0.83   * 164*   CA 8.8 9.5     Recent Labs     01/23/23  0836   WBC 8.0   HGB 14.6   HCT 43.5        Recent Labs     01/23/23  0836   PTP 10.3   INR 1.0   *     No results for input(s): CHOL, LDLC in the last 72 hours.     No lab exists for component: TGL, HDLC,  HBA1C      Current meds:    Current Facility-Administered Medications:     aspirin chewable tablet 81 mg, 81 mg, Oral, DAILY, Marvin Saanbria NP, 81 mg at 01/24/23 0857    clopidogreL (PLAVIX) tablet 75 mg, 75 mg, Oral, DAILY, Marvin Sanabria NP, 75 mg at 01/24/23 0856    metoprolol tartrate (LOPRESSOR) tablet 12.5 mg, 12.5 mg, Oral, Q12H, Marvin Sanabria NP    morphine injection 4 mg, 4 mg, IntraVENous, Q30MIN PRN, Domenico Negrete MD, 4 mg at 01/23/23 0900    atorvastatin (LIPITOR) tablet 20 mg, 20 mg, Oral, QHS, Harrison Gaxiola MD, 20 mg at 01/23/23 2146    sodium chloride (NS) flush 5-40 mL, 5-40 mL, IntraVENous, Q8H, Harrison Gaxiola MD, 10 mL at 01/23/23 2147    sodium chloride (NS) flush 5-40 mL, 5-40 mL, IntraVENous, PRN, Harrison Carson MD    acetaminophen (TYLENOL) tablet 650 mg, 650 mg, Oral, Q6H PRN **OR** acetaminophen (TYLENOL) suppository 650 mg, 650 mg, Rectal, Q6H PRN, Harrison Gaxiola MD    polyethylene glycol (MIRALAX) packet 17 g, 17 g, Oral, DAILY PRN, Harrison Gaxiola MD    ondansetron (ZOFRAN ODT) tablet 4 mg, 4 mg, Oral, Q8H PRN **OR** ondansetron (ZOFRAN) injection 4 mg, 4 mg, IntraVENous, Q6H PRN, Harrison Gaxiola MD    sodium chloride (NS) flush 5-40 mL, 5-40 mL, IntraVENous, Q8H, Сергей Lester, , 10 mL at 01/23/23 2146    sodium chloride (NS) flush 5-40 mL, 5-40 mL, IntraVENous, PRN, DO Lashonda Otero NP    [facility] Cardiology  Call center: [phone]  (Mariela Hernández MD

## 2023-01-24 NOTE — PROGRESS NOTES
[facility]  Hospitalist Group                                                                                          Hospitalist Progress Note  Willy Blevins MD  Answering service: [phone] from in house phone        Date of Service:  2023  NAME:  Devon Mello  :  1965  MRN:  [id number]      Admission Summary:   Ms. Marin Ferrera is a 56 y.o.  female who presented to the Emergency Department complaining of chest pain. Patient is alert recommended x3. As per the patient she was walking her dog this morning around 7 AM.  She started feeling very short of breath. She had a pressure-like pain almost 9 out of 10 in intensity starting from her neck down to her epigastric area. She felt dizzy and nauseated. She denies any vomiting. She came to the ER and was found to have elevated troponin. She is still has a little pressure in the chest.  Her initial troponin were 7 and repeat troponin went up to 1597. She was started on heparin drip. She took 2 aspirins prior to coming to the hospital.  She watched on television program where they recommended taking aspirin so she took it. Interval history / Subjective:   LHC without significant disease, but with Hazy 80-90% stenosis of S1, likely culprit for MI. Troponin up trended post LHC, chest pain persists but has not worsened. Bps borderline low. Trying to up titrate anti anginal's. Assessment & Plan:     NSTEMI- now S/P LHC with likely culprit lesion S1, but no significant CAD. Now on ASA, plavix, Statin, metoprolol. May need cardiac rehab.  cardiology consult. Hyperlipidemia-continue statins. Hypertension- on lisinopril.     Obesity: Diet education     Reviewed outside notes, labs, rads, discussed with subspecialists  Code status: Full  Prophylaxis: SCD  Care Plan discussed with: Patient, Cardiology  Anticipated Disposition: Home in 1-2 days     Hospital Problems  Date Reviewed: 2023 Codes Class Noted POA    * (Principal) NSTEMI (non-ST elevated myocardial infarction) ([facility] [address] ICD-10-CM: I21.4  ICD-9-CM: 410.70  1/23/2023 Yes        HTN (hypertension) ICD-10-CM: I10  ICD-9-CM: 401.9  1/23/2023 Yes        Hyperlipemia ICD-10-CM: E78.5  ICD-9-CM: 272.4  1/23/2023 Yes             Review of Systems:   A comprehensive review of systems was negative except for that written in the HPI. Vital Signs:    Last 24hrs VS reviewed since prior progress note. Most recent are:  Visit Vitals  /73 (BP 1 Location: Left upper arm, BP Patient Position: At rest;Semi fowlers)   Pulse 74   Temp 98.3 °F (36.8 °C)   Resp 15   Ht 5' 7\" (1.702 m)   Wt 93 kg (205 lb)   SpO2 95%   Breastfeeding No   BMI 32.11 kg/m²         Intake/Output Summary (Last 24 hours) at 1/24/2023 1437  Last data filed at 1/24/2023 4296  Gross per 24 hour   Intake 300 ml   Output --   Net 300 ml        Physical Examination:     I had a face to face encounter with this patient and independently examined them on 1/24/2023 as outlined below:          Constitutional:  No acute distress, cooperative, pleasant    ENT:  Oral mucosa moist, oropharynx benign. Resp:  CTA bilaterally. No wheezing/rhonchi/rales. No accessory muscle use. CV:  Regular rhythm, normal rate, no murmurs, gallops, rubs    GI:  Soft, non distended, non tender. normoactive bowel sounds, no hepatosplenomegaly     Musculoskeletal:  No edema, warm, 2+ pulses throughout    Neurologic:  Moves all extremities.   AAOx3, CN II-XII reviewed            Data Review:    Review and/or order of clinical lab test  Review and/or order of tests in the medicine section of CPT      Labs:     Recent Labs     01/24/23  0955 01/23/23  0836   WBC 8.6 8.0   HGB 14.0 14.6   HCT 42.3 43.5    274     Recent Labs     01/24/23  0606 01/23/23  0836    139   K 4.4 4.2    105   CO2 18* 25   BUN 15 20   CREA 0.74 0.83   * 164*   CA 8.8 9.5   MG  --  2.3     Recent Labs 01/23/23  0836   ALT 44   *   TBILI 0.7   TP 7.8   ALB 3.8   GLOB 4.0     Recent Labs     01/23/23  1145 01/23/23  0836   INR  --  1.0   PTP  --  10.3   APTT 23.8  --       No results for input(s): FE, TIBC, PSAT, FERR in the last 72 hours. No results found for: FOL, RBCF   No results for input(s): PH, PCO2, PO2 in the last 72 hours. No results for input(s): CPK, CKNDX, TROIQ in the last 72 hours.     No lab exists for component: CPKMB  Lab Results   Component Value Date/Time    Cholesterol, total 133 01/24/2023 09:55 AM    HDL Cholesterol 48 01/24/2023 09:55 AM    LDL, calculated 60 01/24/2023 09:55 AM    Triglyceride 125 01/24/2023 09:55 AM    CHOL/HDL Ratio 2.8 01/24/2023 09:55 AM     Lab Results   Component Value Date/Time    Glucose (POC) 89 08/16/2010 09:13 AM    Glucose (POC) 94 04/26/2010 07:12 AM     Lab Results   Component Value Date/Time    Color YELLOW/STRAW 03/28/2022 11:20 AM    Appearance CLEAR 03/28/2022 11:20 AM    Specific gravity 1.028 03/28/2022 11:20 AM    Specific gravity >1.030 (H) 07/16/2018 10:30 AM    pH (UA) 5.5 03/28/2022 11:20 AM    Protein 30 (A) 03/28/2022 11:20 AM    Glucose Negative 03/28/2022 11:20 AM    Ketone TRACE (A) 03/28/2022 11:20 AM    Bilirubin Negative 03/28/2022 11:20 AM    Urobilinogen 1.0 03/28/2022 11:20 AM    Nitrites Negative 03/28/2022 11:20 AM    Leukocyte Esterase SMALL (A) 03/28/2022 11:20 AM    Epithelial cells MANY (A) 03/28/2022 11:20 AM    Bacteria Negative 03/28/2022 11:20 AM    WBC 10-20 03/28/2022 11:20 AM    RBC 5-10 03/28/2022 11:20 AM         Medications Reviewed:     Current Facility-Administered Medications   Medication Dose Route Frequency    aspirin chewable tablet 81 mg  81 mg Oral DAILY    clopidogreL (PLAVIX) tablet 75 mg  75 mg Oral DAILY    metoprolol tartrate (LOPRESSOR) tablet 12.5 mg  12.5 mg Oral Q12H    morphine injection 4 mg  4 mg IntraVENous Q30MIN PRN    atorvastatin (LIPITOR) tablet 20 mg  20 mg Oral QHS    sodium chloride (NS) flush 5-40 mL  5-40 mL IntraVENous Q8H    sodium chloride (NS) flush 5-40 mL  5-40 mL IntraVENous PRN    acetaminophen (TYLENOL) tablet 650 mg  650 mg Oral Q6H PRN    Or    acetaminophen (TYLENOL) suppository 650 mg  650 mg Rectal Q6H PRN    polyethylene glycol (MIRALAX) packet 17 g  17 g Oral DAILY PRN    ondansetron (ZOFRAN ODT) tablet 4 mg  4 mg Oral Q8H PRN    Or    ondansetron (ZOFRAN) injection 4 mg  4 mg IntraVENous Q6H PRN    sodium chloride (NS) flush 5-40 mL  5-40 mL IntraVENous Q8H    sodium chloride (NS) flush 5-40 mL  5-40 mL IntraVENous PRN     ______________________________________________________________________  EXPECTED LENGTH OF STAY: 1d 16h  ACTUAL LENGTH OF STAY:          1                 Xena Mendoza MD

## 2023-01-24 NOTE — PROGRESS NOTES
0700: Bedside and Verbal shift change report given to Marcy Martinez (oncoming nurse) by Konrad Gunn RN (offgoing nurse). Report included the following information SBAR, Kardex, Accordion, and Cardiac Rhythm NSR . This patient was assisted with Intentional Toileting every 2 hours during this shift as appropriate. Documentation of ambulation and output reflected on Flowsheet as appropriate. Purposeful hourly rounding was completed using AIDET and 5Ps. Outcomes of PHR documented as they occurred. Bed alarm in use as appropriate. Dual Suction and ambubag in place.

## 2023-01-24 NOTE — PROGRESS NOTES
Reason for Admission:    Dx: NSTEMI                   RUR Score:  9%                Plan for utilizing home health:  None       PCP: First and Last name:  Edson Rolon MD     Name of Practice:    Are you a current patient: Yes/No:Yes   Approximate date of last visit: 9/14/22   Can you participate in a virtual visit with your PCP: Yes                    Current Advanced Directive/Advance Care Plan: Full Code      Healthcare Decision Maker:   Click here to complete [address] including selection of the Healthcare Decision Maker Relationship (ie \"Primary\")           Gregorio Arguelles ([phone]) is the decision maker per patients request.                  Transition of Care Plan:   RX: [address]: None    DME: None  Home/steps: 1 story home, 4 Steps in back entrance  Transportation: Friend Brayden Snell) [phone]    FLEMING [place] Plan  Return home with family support  CM needs: None  3. Transport home with friend Brayden Snell) [phone]  4. Independent prior to admission  5. CM to monitor progress and recommendations    Care Management Interventions  PCP Verified by CM: Yes  Last Visit to PCP: 09/14/22  Mode of Transport at Discharge:  Other (see comment) (Friend (Mariely Lao))  Transition of Care Consult (CM Consult): Discharge Planning  Support Systems: Friend/Neighbor  Confirm Follow Up Transport: Friends  The Plan for Transition of Care is Related to the Following Treatment Goals : Return Home  Discharge Location  Patient Expects to be Discharged to[de-identified] Home with family assistance (Home)   Jesenia Quinones, Care Manager

## 2023-01-24 NOTE — PROGRESS NOTES
TRANSFER - IN REPORT:    Verbal report received from [facility], RN(name) on [place] Duty  being received from CATH LAB(unit) for routine progression of care      Report consisted of patients Situation, Background, Assessment and   Recommendations(SBAR). Information from the following report(s) SBAR, Kardex, Intake/Output, MAR, Accordion, and Recent Results was reviewed with the receiving nurse. Opportunity for questions and clarification was provided. Assessment completed upon patients arrival to unit and care assumed. Bedside and Verbal shift change report given to [facility], RN (oncoming nurse) by Eve Sever, RN (offgoing nurse). Report included the following information SBAR, Kardex, Intake/Output, MAR, Accordion, and Recent Results.

## 2023-01-24 NOTE — PROGRESS NOTES
Problem: Cath Lab Procedures: Pre-Procedure  Goal: Off Pathway (Use only if patient is Off Pathway)  Outcome: Progressing Towards Goal  Goal: Activity/Safety  Outcome: Progressing Towards Goal  Goal: Consults, if ordered  Outcome: Progressing Towards Goal  Goal: Diagnostic Test/Procedures  Outcome: Progressing Towards Goal  Goal: Nutrition/Diet  Outcome: Progressing Towards Goal  Goal: Discharge Planning  Outcome: Progressing Towards Goal  Goal: Medications  Outcome: Progressing Towards Goal  Goal: Respiratory  Outcome: Progressing Towards Goal  Goal: Treatments/Interventions/Procedures  Outcome: Progressing Towards Goal  Goal: Psychosocial  Outcome: Progressing Towards Goal  Goal: *Verbalize description of procedure  Outcome: Progressing Towards Goal  Goal: *Consent signed  Outcome: Progressing Towards Goal     Problem: Patient Education: Go to Patient Education Activity  Goal: Patient/Family Education  Outcome: Progressing Towards Goal

## 2023-01-24 NOTE — PROGRESS NOTES
Spiritual Care Assessment/Progress Note  [address]      NAME: Makenzie Metz      MRN: [id number]  AGE: 56 y.o.  SEX: female  Temple Affiliation: Spiritism   Language: English     1/24/2023     Total Time (in minutes): 5     Spiritual Assessment begun in SF 3 PROG CARE TELE 2 through conversation with:         [x]Patient        [] Family    [] Friend(s)        Reason for Consult: [facility]     Spiritual beliefs: (Please include comment if needed)     [x] Identifies with a flakito tradition: Spiritism        [x] Supported by a flakito community: [address]           [] Claims no spiritual orientation:           [] Seeking spiritual identity:                [] Adheres to an individual form of spirituality:           [] Not able to assess:                           Identified resources for coping:      [x] Prayer                               [x] Music                  [] Guided Imagery     [x] Family/friends                 [] Pet visits     [] Devotional reading                         [] Unknown     [] Other:                                               Interventions offered during this visit: (See comments for more details)    Patient Interventions: Affirmation of flakito, Communion (Spiritism), Initial/Spiritual assessment, patient floor, Prayer (actual), Prayer (assurance of)           Plan of Care:     [x] Support spiritual and/or cultural needs    [] Support AMD and/or advance care planning process      [] Support grieving process   [] Coordinate Rites and/or Rituals    [] Coordination with community clergy   [] No spiritual needs identified at this time   [] Detailed Plan of Care below (See Comments)  [] Make referral to Music Therapy  [] Make referral to Pet Therapy     [] Make referral to Addiction services  [] Make referral to [facility]  [] Make referral to Spiritual Care Partner  [] No future visits requested        [] Contact Spiritual Care for further referrals Comments: Mrs. Noel Young declined communion today. She was pleased to have Fr. Lay visit. No spiritual needs  identified at this time.     DAVID Yan, RN, ACSW, LCSW   Page:  [phone]

## 2023-01-24 NOTE — DISCHARGE INSTRUCTIONS
Radial Cardiac Catheterization/Angiography Discharge Instructions   It is normal to feel tired the first couple days. Take it easy and follow the physicians instructions. CHECK THE CATHETER INSERTION SITE DAILY:   Remove the wrist dressing 24 hours after the procedure. You may shower 24 hours after the procedure. Wash with soap and water and pat dry. Gentle cleaning of the site with soap and water is sufficient, cover with a dry clean dressing or bandage. Do not apply creams or powders to the area. No soaking the wrist for 3 days. Leave the puncture site open to air after 24 hours post-procedure. CALL THE PHYSICIANS:   If the site becomes red, swollen or feels warm to the touch   If there is bleeding or drainage or if there is unusual pain at the radial site. If there is any minor oozing, you may apply a band-aid and remove after 12 hours. If the bleeding continues, hold pressure with the middle finger against the puncture site and the thumb against the back of the wrist,call 911 to be transported to the hospital.   DO NOT DRIVE YOURSELF, [facility] 998. ACTIVITY:   For the first 24 hours do not manipulate the wrist.   No lifting, pushing or pulling over 3-5 pounds with the affected wrist for 7 daysand no straining the insertion site. Do not life grocery bags or the garbage can, do not run the vacuum  or  for 7 days. Start with short walks as in the hospital and gradually increase as tolerated each day. It is recommended to walk 30 minutes 5-7 days per week. Follow your physicians instructions on activity. Avoid walking outside in extremes of heat or cold. Walk inside when it is cold and windy or hot and humid. Things to keep in mind:   No driving for at least 24 hours, or as designated by your physician. Limit the number of times you go up and down the stairs   Take rests and pace yourself with activity.    Be careful and do not strain with bowel movements. MEDICATIONS:   Take all medications as prescribed   Call your physician if you have any questions   Keep an updated list of your medications with you at all times and give a list to your physician and pharmacist   SIGNS AND SYMPTOMS:   Be cautious of symptoms of angina or recurrent symptoms such as chest discomfort, unusual shortness of breath or fatigue. These could be symptoms of restenosis, a new blockage or a heart attack. If your symptoms are relieved with rest it is still recommended that you notify your physician of recurrent chest pain or discomfort. For CHEST PAIN or symptoms of angina not relieved with rest: If the discomfort is not relieved with rest, and you have been prescribed Nitroglycerin, take as directed (taken under the tongue, one at a time 5 minutes apart for a total of 3 doses). If the discomfort is not relieved after the 3rd nitroglycerin, call 911. If you have not been prescribed Nitroglycerin and your chest discomfort is not relieved with rest, call 911. AFTER CARE:   Follow up with your physician as instructed. Follow a heart healthy diet with proper portion control, daily stress management, daily exercise, blood pressure and cholesterol control , and smoking cessation. Discharge Summary         PATIENT ID: Jose Cameron  MRN: [id number]   YOB: 1965    DATE OF ADMISSION: 1/23/2023  8:15 AM    DATE OF DISCHARGE: 25 January 2023   PRIMARY CARE PROVIDER: Fazal Rodriguez MD      ATTENDING PHYSICIAN: Governor Richard  DISCHARGING PROVIDER: Gregorio Schwartz MD       CONSULTATIONS: IP CONSULT TO CARDIOLOGY  IP CONSULT TO FAMILY PRACTICE     PROCEDURES/SURGERIES: Procedure(s):  LEFT HEART CATH / [address] [facility] COURSE:   68-year-old woman with a past medical history of hypertension and hyperlipidemia who was admitted for an NSTEMI.   Left heart cath was performed and demonstrated acute occlusion of the septal artery for which she will continue on dual antiplatelet therapy for 6 months. She would otherwise no significant coronary artery disease. She should follow-up with cardiology as an outpatient. She will be discharged on aspirin, Plavix, statin, beta-blocker, and ARB. DISCHARGE DIAGNOSES / PLAN:       NSTEMI- now S/P [facility] with likely culprit lesion S1, but no significant CAD. Echo was obtained and was normal.  Now on ASA, plavix, Statin, metoprolol and valsartan. Patient should follow-up with cardiology as an outpatient. Strict return precautions provided. cardiology consult. Hyperlipidemia-continue statin    Hypertension- on losartan and metoprolol     Obesity: Diet education        BMI: Body mass index is 32.11 kg/m². . This patient: Meets criteria for obesity given BMI >/= 30 and < 40 due to excess calories/nutritional. Weight loss and lifestyle modifications should be encouraged as an outpatient. PENDING TEST RESULTS:   At the time of discharge the following test results are still pending: None      ADDITIONAL CARE RECOMMENDATIONS:   Follow-up with cardiology as an outpatient. You need to be on dual antiplatelet therapy (aspirin and Plavix) for him of 6 months. You need to follow-up with your primary care physician     DIET: Cardiac Diet     ACTIVITY: Activity as tolerated     EQUIPMENT needed: None     NOTIFY YOUR PHYSICIAN FOR ANY OF THE FOLLOWING:   Fever over 101 degrees for 24 hours. Chest pain, shortness of breath, fever, chills, nausea, vomiting, diarrhea, change in mentation, falling, weakness, bleeding. Severe pain or pain not relieved by medications, as well as any other signs or symptoms that you may have questions about.      FOLLOW UP APPOINTMENTS:    Follow-up Information         Follow up With Specialties Details Why Contact Info     Callie Molina Nurse Practitioner Follow up on 2/14/2023 3:40 pm [address]  [phone]        Mag Eddy CORNELIUS Acosta [address]  [address]  [phone]                   DISCHARGE MEDICATIONS:  Current Discharge Medication List              START taking these medications     Details   aspirin 81 mg chewable tablet Take 1 Tablet by mouth daily for 90 days. May purchase over the counter  Qty: 30 Tablet, Refills: 2  Start date: 1/26/2023, End date: 4/26/2023       clopidogreL (PLAVIX) 75 mg tab Take 1 Tablet by mouth daily for 90 days. Qty: 30 Tablet, Refills: 2  Start date: 1/26/2023, End date: 4/26/2023       metoprolol tartrate (LOPRESSOR) 25 mg tablet Take 0.5 Tablets by mouth every twelve (12) hours for 90 days.   Qty: 30 Tablet, Refills: 2  Start date: 1/25/2023, End date: 4/25/2023                  CONTINUE these medications which have NOT CHANGED     Details   atorvastatin (LIPITOR) 20 mg tablet TAKE 1 TABLET BY MOUTH EVERYDAY AT BEDTIME       valsartan (DIOVAN) 80 mg tablet

## 2023-01-25 VITALS
BODY MASS INDEX: 32.18 KG/M2 | SYSTOLIC BLOOD PRESSURE: 98 MMHG | HEART RATE: 76 BPM | OXYGEN SATURATION: 93 % | TEMPERATURE: 97.8 F | HEIGHT: 67 IN | DIASTOLIC BLOOD PRESSURE: 70 MMHG | WEIGHT: 205 LBS | RESPIRATION RATE: 12 BRPM

## 2023-01-25 LAB
ANION GAP SERPL CALC-SCNC: 7 MMOL/L (ref 5–15)
ATRIAL RATE: 62 BPM
ATRIAL RATE: 70 BPM
BASOPHILS # BLD: 0 K/UL (ref 0–0.1)
BASOPHILS NFR BLD: 0 % (ref 0–1)
BUN SERPL-MCNC: 13 MG/DL (ref 6–20)
BUN/CREAT SERPL: 17 (ref 12–20)
CALCIUM SERPL-MCNC: 9 MG/DL (ref 8.5–10.1)
CALCULATED P AXIS, ECG09: 36 DEGREES
CALCULATED P AXIS, ECG09: 41 DEGREES
CALCULATED R AXIS, ECG10: 83 DEGREES
CALCULATED R AXIS, ECG10: 86 DEGREES
CALCULATED T AXIS, ECG11: 77 DEGREES
CALCULATED T AXIS, ECG11: 87 DEGREES
CHLORIDE SERPL-SCNC: 106 MMOL/L (ref 97–108)
CO2 SERPL-SCNC: 25 MMOL/L (ref 21–32)
CREAT SERPL-MCNC: 0.78 MG/DL (ref 0.55–1.02)
DIAGNOSIS, 93000: NORMAL
DIAGNOSIS, 93000: NORMAL
DIFFERENTIAL METHOD BLD: NORMAL
EOSINOPHIL # BLD: 0.3 K/UL (ref 0–0.4)
EOSINOPHIL NFR BLD: 4 % (ref 0–7)
ERYTHROCYTE [DISTWIDTH] IN BLOOD BY AUTOMATED COUNT: 12.8 % (ref 11.5–14.5)
GLUCOSE SERPL-MCNC: 160 MG/DL (ref 65–100)
HCT VFR BLD AUTO: 41.2 % (ref 35–47)
HGB BLD-MCNC: 13.6 G/DL (ref 11.5–16)
IMM GRANULOCYTES # BLD AUTO: 0 K/UL (ref 0–0.04)
IMM GRANULOCYTES NFR BLD AUTO: 0 % (ref 0–0.5)
LYMPHOCYTES # BLD: 1.6 K/UL (ref 0.8–3.5)
LYMPHOCYTES NFR BLD: 20 % (ref 12–49)
MCH RBC QN AUTO: 29.8 PG (ref 26–34)
MCHC RBC AUTO-ENTMCNC: 33 G/DL (ref 30–36.5)
MCV RBC AUTO: 90.2 FL (ref 80–99)
MONOCYTES # BLD: 0.4 K/UL (ref 0–1)
MONOCYTES NFR BLD: 5 % (ref 5–13)
NEUTS SEG # BLD: 5.6 K/UL (ref 1.8–8)
NEUTS SEG NFR BLD: 71 % (ref 32–75)
NRBC # BLD: 0 K/UL (ref 0–0.01)
NRBC BLD-RTO: 0 PER 100 WBC
P-R INTERVAL, ECG05: 172 MS
P-R INTERVAL, ECG05: 174 MS
PLATELET # BLD AUTO: 262 K/UL (ref 150–400)
PMV BLD AUTO: 11.4 FL (ref 8.9–12.9)
POTASSIUM SERPL-SCNC: 3.6 MMOL/L (ref 3.5–5.1)
Q-T INTERVAL, ECG07: 418 MS
Q-T INTERVAL, ECG07: 438 MS
QRS DURATION, ECG06: 90 MS
QRS DURATION, ECG06: 92 MS
QTC CALCULATION (BEZET), ECG08: 444 MS
QTC CALCULATION (BEZET), ECG08: 451 MS
RBC # BLD AUTO: 4.57 M/UL (ref 3.8–5.2)
SODIUM SERPL-SCNC: 138 MMOL/L (ref 136–145)
VENTRICULAR RATE, ECG03: 62 BPM
VENTRICULAR RATE, ECG03: 70 BPM
WBC # BLD AUTO: 8 K/UL (ref 3.6–11)

## 2023-01-25 PROCEDURE — 36415 COLL VENOUS BLD VENIPUNCTURE: CPT

## 2023-01-25 PROCEDURE — 80048 BASIC METABOLIC PNL TOTAL CA: CPT

## 2023-01-25 PROCEDURE — 74011250637 HC RX REV CODE- 250/637: Performed by: NURSE PRACTITIONER

## 2023-01-25 PROCEDURE — APPSS30 APP SPLIT SHARED TIME 16-30 MINUTES: Performed by: NURSE PRACTITIONER

## 2023-01-25 PROCEDURE — 99239 HOSP IP/OBS DSCHRG MGMT >30: CPT | Performed by: SPECIALIST

## 2023-01-25 PROCEDURE — 85025 COMPLETE CBC W/AUTO DIFF WBC: CPT

## 2023-01-25 RX ORDER — GUAIFENESIN 100 MG/5ML
81 LIQUID (ML) ORAL DAILY
Qty: 30 TABLET | Refills: 2 | Status: SHIPPED
Start: 2023-01-26 | End: 2023-04-26

## 2023-01-25 RX ORDER — METOPROLOL TARTRATE 25 MG/1
12.5 TABLET, FILM COATED ORAL EVERY 12 HOURS
Qty: 30 TABLET | Refills: 2 | Status: SHIPPED | OUTPATIENT
Start: 2023-01-25 | End: 2023-04-25

## 2023-01-25 RX ORDER — CLOPIDOGREL BISULFATE 75 MG/1
75 TABLET ORAL DAILY
Qty: 30 TABLET | Refills: 2 | Status: SHIPPED | OUTPATIENT
Start: 2023-01-26 | End: 2023-04-26

## 2023-01-25 RX ADMIN — CLOPIDOGREL BISULFATE 75 MG: 75 TABLET ORAL at 08:30

## 2023-01-25 RX ADMIN — ASPIRIN 81 MG: 81 TABLET, CHEWABLE ORAL at 08:30

## 2023-01-25 RX ADMIN — METOPROLOL TARTRATE 12.5 MG: 25 TABLET, FILM COATED ORAL at 08:30

## 2023-01-25 NOTE — PROGRESS NOTES
[facility]                                                          Cardiology Care Note               Donta Coronado MD, [address], [place], [address] [facility]             Phone [phone]; Fax [phone]           Mobile [phone]   Voice Mail [phone]     []Initial Encounter     [x]Follow-up    Patient Name: Jose Cameron - DVE:[id number] - WFJ:[id number]  Primary Cardiologist: [facility] Cardiology Physicians: none  Consulting Cardiologist: [facility] Cardiology Physicians: Jb Dickinson MD     Reason for encounter: NSTEMI    HPI:       Jose Cameron is a 56 y.o. female with PMH significant for HTN and dyslipidemia. She developed a sudden onset of chest pain from neck to abdomen. She had SOB and diaphoretic. She was going up a slight hill. Felt nausea and as though she was going to pass out. Never had similar sx's. She works for [employer]. No similar sxs. No nocturnal or postprandial pain. Subjective:      Jose Cameron reports no further CP, walking around well without symptoms.      Cardiac risk factors: dyslipidemia, obesity, sedentary life style, hypertension, post-menopausal.     Assessment and Plan     1) Chest discomfort/NSTEMI  -initial trop negative followed by significant bump  -CT negative for dissection and D dimer not impressive for PE  -ECG negative  - s/p cath 1/23/23 w/ probable acute occlusion of septal artery, cont DAPT x 6 mos  - cont low dose metoprolol  - TTE w/ normal EF    2) Dyslipidemia  - lipid panel reviewed  - cont lipitor     3) HTN  - BP low but tolerating metoprolol 12.5 mg    4) Obesity BMI 32    D/c home today       ____________________________________________________________    Cardiac testing    01/23/23    CARDIAC PROCEDURE 01/23/2023 1/23/2023    Conclusion  · Angiographically normal coronary arteries with probable acute occlusion of septal (possible scad) as etiology of AMI  · Elevated lvedp      Findings:  L Main: large caliber vessel, angioraphically normal  LAD: large caliber, two moderate diagonals, minimal disease, S1 with hazy 80-90% stenosis (probable culprit for AMI)  LCx: large caliber, continues as large marginal with distal branching, angiographically normal  RCA: large caliber, moderate to large pda and large pl branches, angiographically normal    LVEDP:  20  mmhg    Signed by: Rober Gonzales DO on 1/23/2023  2:50 PM    01/23/23    ECHO ADULT COMPLETE 01/24/2023 1/24/2023    Interpretation Summary    Left Ventricle: Normal left ventricular systolic function with a visually estimated EF of 55 - 60%. Left ventricle size is normal. LVIDd is 5.4 cm. Mildly increased wall thickness. IVSd is 1.0 cm. LVPWd is 1.0 cm. See diagram for wall motion findings. Aortic Valve: Tricuspid valve. Contrast used: Definity. Signed by: Greg Esteves MD on 1/24/2023 11:26 AM    Most recent HS troponins:  Recent Labs     01/24/23  0606 01/23/23  1018 01/23/23  0836   TROPHS 12,171* 1,597* 7       ECG: reported by ER physician to be normal. Not scanned in computer and unable to find. Review of Systems:    [x]All other systems reviewed and all negative except as written in HPI    [] Patient unable to provide secondary to condition    Past Medical History:   Diagnosis Date    Hx of abnormal Pap smear 2012    HPV Positive     Past Surgical History:   Procedure Laterality Date    HX DILATION AND CURETTAGE      HX HYSTEROSCOPY      HX LAPAROSCOPIC SUPRACERVICAL HYSTERECTOMY  2010    HX MYOMECTOMY       Social Hx:  reports that she has never smoked. She has never used smokeless tobacco. She reports current alcohol use. She reports that she does not use drugs. Family Hx: family history is not on file. Allergies   Allergen Reactions    Penicillins Other (comments)     Syncope    Other reaction(s):  Other (See Comments) OBJECTIVE:  Wt Readings from Last 3 Encounters:   01/23/23 93 kg (205 lb)   09/06/22 90.7 kg (200 lb)   07/26/22 94.4 kg (208 lb 3.2 oz)       Intake/Output Summary (Last 24 hours) at 1/25/2023 0835  Last data filed at 1/24/2023 1930  Gross per 24 hour   Intake 0 ml   Output --   Net 0 ml       Physical Exam:    Vitals:   Vitals:    01/24/23 2316 01/25/23 0021 01/25/23 0432 01/25/23 0822   BP:  108/71 95/61 125/77   Pulse: 75 76 79 80   Resp:  14 12 13   Temp:  98.2 °F (36.8 °C) 97.7 °F (36.5 °C) 98 °F (36.7 °C)   SpO2:  95% 95% 95%   Weight:       Height:                                                                                                                                                                                                                                                                                                                                                                                                                                                                                                                                                                                                                                                                                                              Gen: Well-developed, well-nourished, in no acute distress,overweight  Neck: Supple, No JVD, No Carotid Bruit  Resp: No accessory muscle use, Clear breath sounds, No rales or rhonchi  Card: Regular Rate,Rythm, Normal S1, S2, No murmurs, rubs or gallop. No thrills. Abd:   Soft, non-tender, non-distended, BS+   MSK: No cyanosis  Skin: No rashes    Neuro: Moving all four extremities, follows commands appropriately  Psych: Good insight, oriented to person, place, alert, Nml Affect  LE: No edema    No specialty comments available.     Data Review:     Radiology:   XR Results (most recent):  Results from Hospital Encounter encounter on 01/23/23    XR CHEST PORT    Narrative  EXAM:  XR CHEST PORT    INDICATION: Chest pain    COMPARISON: 8/16/2010    TECHNIQUE: portable chest AP view    FINDINGS: The cardiac silhouette is within normal limits. The pulmonary  vasculature is within normal limits. The lungs and pleural spaces are clear. The visualized bones and upper abdomen  are age-appropriate.     Impression  No acute process on portable chest.      Recent Labs     01/25/23  0018 01/24/23  0606    138   K 3.6 4.4    108   CO2 25 18*   BUN 13 15   CREA 0.78 0.74   * 147*   CA 9.0 8.8     Recent Labs     01/25/23  0018 01/24/23  0955   WBC 8.0 8.6   HGB 13.6 14.0   HCT 41.2 42.3    248     Recent Labs     01/23/23  0836   PTP 10.3   INR 1.0   *     Recent Labs     01/24/23  0955   CHOL 133   LDLC 60         Current meds:    Current Facility-Administered Medications:     aspirin chewable tablet 81 mg, 81 mg, Oral, DAILY, Smitha Sanabria NP, 81 mg at 01/25/23 0830    clopidogreL (PLAVIX) tablet 75 mg, 75 mg, Oral, DAILY, Smitha Sanabria NP, 75 mg at 01/25/23 0830    metoprolol tartrate (LOPRESSOR) tablet 12.5 mg, 12.5 mg, Oral, Q12H, Smitha Sanabria NP, 12.5 mg at 01/25/23 0830    morphine injection 4 mg, 4 mg, IntraVENous, Q30MIN PRN, Ronald Sanabria MD, 4 mg at 01/23/23 0900    atorvastatin (LIPITOR) tablet 20 mg, 20 mg, Oral, QHS, Harrison Gaxioal MD, 20 mg at 01/24/23 2240    sodium chloride (NS) flush 5-40 mL, 5-40 mL, IntraVENous, Q8H, Harrison Gaxiola MD, 10 mL at 01/23/23 2147    sodium chloride (NS) flush 5-40 mL, 5-40 mL, IntraVENous, PRN, Harrison Bates MD    acetaminophen (TYLENOL) tablet 650 mg, 650 mg, Oral, Q6H PRN **OR** acetaminophen (TYLENOL) suppository 650 mg, 650 mg, Rectal, Q6H PRN, Harrison Gaxiola MD    polyethylene glycol (MIRALAX) packet 17 g, 17 g, Oral, DAILY PRN, Harrison Bates MD, 17 g at 01/24/23 0948    ondansetron (ZOFRAN ODT) tablet 4 mg, 4 mg, Oral, Q8H PRN **OR** ondansetron (ZOFRAN) injection 4 mg, 4 mg, IntraVENous, Q6H PRN, Harrison Kaufman MD    sodium chloride (NS) flush 5-40 mL, 5-40 mL, IntraVENous, Q8H, Сергей Lester, , 10 mL at 01/23/23 2146    sodium chloride (NS) flush 5-40 mL, 5-40 mL, IntraVENous, PRN, DO Lieutenant Toan Child NP    [facility] Cardiology  Call center: [phone]) [phone]      Jocelin Short MD

## 2023-01-25 NOTE — PROGRESS NOTES
19:00    Bedside and Verbal shift change report given to Angy Chavez (oncoming nurse) by Keiko Landis (offgoing nurse). Report included the following information SBAR and Kardex. This patient was assisted with Intentional Toileting every 2 hours during this shift as appropriate. Documentation of ambulation and output reflected on Flowsheet as appropriate. Purposeful hourly rounding was completed using AIDET and 5Ps. Outcomes of PHR documented as they occurred. Bed alarm in use as appropriate. Dual Suction and ambubag in place.

## 2023-01-25 NOTE — DISCHARGE SUMMARY
Discharge Summary       PATIENT ID: Devon Mello  MRN: [id number]   YOB: 1965    DATE OF ADMISSION: 1/23/2023  8:15 AM    DATE OF DISCHARGE: 25 January 2023   PRIMARY CARE PROVIDER: Kanchan Murray MD     ATTENDING PHYSICIAN: Lorna Vuong  DISCHARGING PROVIDER: Willy Blevins MD      CONSULTATIONS: IP CONSULT TO CARDIOLOGY  IP CONSULT TO FAMILY PRACTICE    PROCEDURES/SURGERIES: Procedure(s):  LEFT HEART CATH / Nargis Cummings 29 COURSE:   60-year-old woman with a past medical history of hypertension and hyperlipidemia who was admitted for an NSTEMI. Left heart cath was performed and demonstrated acute occlusion of the septal artery for which she will continue on dual antiplatelet therapy for 6 months. She would otherwise no significant coronary artery disease. She should follow-up with cardiology as an outpatient. She will be discharged on aspirin, Plavix, statin, beta-blocker, and ARB. DISCHARGE DIAGNOSES / PLAN:      NSTEMI- now S/P C with likely culprit lesion S1, but no significant CAD. Echo was obtained and was normal.  Now on ASA, plavix, Statin, metoprolol and valsartan. Patient should follow-up with cardiology as an outpatient. Strict return precautions provided. cardiology consult. Hyperlipidemia-continue statin    Hypertension- on losartan and metoprolol     Obesity: Diet education       BMI: Body mass index is 32.11 kg/m². . This patient: Meets criteria for obesity given BMI >/= 30 and < 40 due to excess calories/nutritional. Weight loss and lifestyle modifications should be encouraged as an outpatient. PENDING TEST RESULTS:   At the time of discharge the following test results are still pending: None     ADDITIONAL CARE RECOMMENDATIONS:   Follow-up with cardiology as an outpatient. You need to be on dual antiplatelet therapy (aspirin and Plavix) for him of 6 months.   You need to follow-up with your primary care physician    DIET: Cardiac Diet    ACTIVITY: Activity as tolerated    EQUIPMENT needed: None    NOTIFY YOUR PHYSICIAN FOR ANY OF THE FOLLOWING:   Fever over 101 degrees for 24 hours. Chest pain, shortness of breath, fever, chills, nausea, vomiting, diarrhea, change in mentation, falling, weakness, bleeding. Severe pain or pain not relieved by medications, as well as any other signs or symptoms that you may have questions about. FOLLOW UP APPOINTMENTS:    Follow-up Information       Follow up With Specialties Details Why Contact Info    Callie Cheema Nurse Practitioner Follow up on 2/14/2023 3:40 pm [address] [facility] [address] [place] [address]  [phone]      Ethan Lee MD Family Medicine   [address] [facility]  [address] [place] [address]  [phone]               DISCHARGE MEDICATIONS:  Current Discharge Medication List        START taking these medications    Details   aspirin 81 mg chewable tablet Take 1 Tablet by mouth daily for 90 days. May purchase over the counter  Qty: 30 Tablet, Refills: 2  Start date: 1/26/2023, End date: 4/26/2023      clopidogreL (PLAVIX) 75 mg tab Take 1 Tablet by mouth daily for 90 days. Qty: 30 Tablet, Refills: 2  Start date: 1/26/2023, End date: 4/26/2023      metoprolol tartrate (LOPRESSOR) 25 mg tablet Take 0.5 Tablets by mouth every twelve (12) hours for 90 days.   Qty: 30 Tablet, Refills: 2  Start date: 1/25/2023, End date: 4/25/2023           CONTINUE these medications which have NOT CHANGED    Details   atorvastatin (LIPITOR) 20 mg tablet TAKE 1 TABLET BY MOUTH EVERYDAY AT BEDTIME      valsartan (DIOVAN) 80 mg tablet            STOP taking these medications       predniSONE (STERAPRED DS) 10 mg dose pack Comments:   Reason for Stopping:         estradioL (ESTRACE) 0.01 % (0.1 mg/gram) vaginal cream Comments:   Reason for Stopping:         nystatin (MYCOSTATIN) topical cream Comments:   Reason for Stopping:         naproxen (NAPROSYN) 500 mg tablet Comments: Reason for Stopping:         triamcinolone acetonide (KENALOG) 0.1 % ointment Comments:   Reason for Stopping:         lisinopril (PRINIVIL, ZESTRIL) 10 mg tablet Comments:   Reason for Stopping:         clindamycin (CLEOCIN T) 1 % lotion Comments:   Reason for Stopping:         naproxen sodium (NAPROSYN) 220 mg tablet Comments:   Reason for Stopping:               DISPOSITION:  x  Home With:   OT  PT  HH  RN       Long term SNF/Inpatient Rehab    Independent/assisted living    Hospice    Other:     PATIENT CONDITION AT DISCHARGE:     Functional status    Poor     Deconditioned    x Independent      Cognition    x Lucid     Forgetful     Dementia      Catheters/lines (plus indication)    Sultana     PICC     PEG    x None      Code status     Full code     DNR      PHYSICAL EXAMINATION AT DISCHARGE:    General:          Alert, cooperative, no distress, appears stated age. HEENT:           Atraumatic, anicteric sclerae, pink conjunctivae                          No oral ulcers, mucosa moist, throat clear, dentition fair  Neck:               Supple, symmetrical  Lungs:             Clear to auscultation bilaterally. No Wheezing or Rhonchi. No rales. Chest wall:      No tenderness  No Accessory muscle use. Heart:              Regular  rhythm,  No  murmur   No edema  Abdomen:        Soft, non-tender. Not distended. Bowel sounds normal  Extremities:     No cyanosis. No clubbing,                            Skin turgor normal, Capillary refill normal  Skin:                Not pale. Not Jaundiced  No rashes   Psych:             Not anxious or agitated.   Neurologic:      Alert, moves all extremities, answers questions appropriately and responds to commands       CHRONIC MEDICAL DIAGNOSES:  Problem List as of 1/25/2023 Date Reviewed: 1/23/2023            Codes Class Noted - Resolved    * (Principal) NSTEMI (non-ST elevated myocardial infarction) ([facility] ICD-10-CM: I21.4  ICD-9-CM: 410.70  1/23/2023 - Present        HTN (hypertension) ICD-10-CM: I10  ICD-9-CM: 401.9  1/23/2023 - Present        Hyperlipemia ICD-10-CM: E78.5  ICD-9-CM: 272.4  1/23/2023 - Present           Greater than 45 minutes were spent with the patient on counseling and coordination of care    Signed:   Willy Blevins MD  1/25/2023  11:00 AM    .

## 2023-01-25 NOTE — PROGRESS NOTES
0700: Bedside and Verbal shift change report given to Marcy Martinez (oncoming nurse) by Bree Sorensen RN (offgoing nurse). Report included the following information SBAR, Kardex, Accordion, and Cardiac Rhythm NSR .    1135: I have reviewed discharge instructions with the patient. The patient verbalized understanding. Patient is discharged. This patient was assisted with Intentional Toileting every 2 hours during this shift as appropriate. Documentation of ambulation and output reflected on Flowsheet as appropriate. Purposeful hourly rounding was completed using AIDET and 5Ps. Outcomes of PHR documented as they occurred. Bed alarm in use as appropriate. Dual Suction and ambubag in place.

## 2023-01-25 NOTE — PROGRESS NOTES
Hospital Follow-up appointment with Dr.Donald Garcia on 2/14/2023 at 2:30 pm . Please arrive at 2 :15 pm to complete all paperwork and brink discharge paperwork from [facility] [place]

## 2023-01-31 ENCOUNTER — TELEPHONE (OUTPATIENT)
Dept: CARDIOLOGY CLINIC | Age: 58
End: 2023-01-31

## 2023-01-31 ENCOUNTER — TELEPHONE (OUTPATIENT)
Dept: CARDIAC REHAB | Age: 58
End: 2023-01-31

## 2023-01-31 NOTE — TELEPHONE ENCOUNTER
Patient called to see if she is still suppose to be taking her bp medication Valsartan 80mg.  Would like a call at [phone]

## 2023-01-31 NOTE — TELEPHONE ENCOUNTER
[facility] Cardiopulmonary Rehab: Spoke to pt about whether she decided to participate in cardiac rehab, s/p cardiac cath (1/23). Initial dx NSTEMI. Per Dr Tawnya Hill, \"probable acute occlusion of septal/poss SCAD. \" Pt expressed desire to come to rehab; however, she works M-F as  at [employer]. She would need to come in at 8am on Tue/Thu to exercise & get out by 9am. Explained that the intake appt takes about 1 hr 45 min. Pt asked to call back to schedule intake appt.

## 2023-02-02 NOTE — TELEPHONE ENCOUNTER
Returned patient's call advised per hospital discharge papers she is to continue taking the Valsartan. Patient verbalized understanding.

## 2023-02-07 ENCOUNTER — HOSPITAL ENCOUNTER (OUTPATIENT)
Dept: CARDIAC REHAB | Age: 58
Discharge: HOME | End: 2023-02-07
Payer: COMMERCIAL

## 2023-02-07 VITALS — BODY MASS INDEX: 32.8 KG/M2 | WEIGHT: 209 LBS | HEIGHT: 67 IN

## 2023-02-07 PROCEDURE — 93798 PHYS/QHP OP CAR RHAB W/ECG: CPT

## 2023-02-07 NOTE — CARDIO/PULMONARY
[facility] Cardiac Rehab -Intake Note  Mary Ann Perla LANEY 1965 presented for cardiac rehab orientation and exercise tolerance test today with a primary diagnosis of NSTEMI with SCAD of septal branch of LAD. Pt has history of HTN and dyslipidemia. LVEF is 55% via ECHO. Cardiac risk factors include: HTN, HLD, and sedentary lifestyle. Pt is single. Pt works f/t for [employer]. Depression score PHQ9 is 0 and this is considered normal.  Patient denied chest pain or SOB during 6 minute walk on treadmill at 1.9 mph, with RPE 11. Cardiac rhythm was SR/ST with UF bigeminy in pre-post rest and occ UF PVCs during the 6 minute treadmill test.   Exercise plan was developed. Pt will attend cardiac rehab 2 times per week. Cardiac Rehab book reviewed and given to patient. [employer]

## 2023-02-09 ENCOUNTER — HOSPITAL ENCOUNTER (OUTPATIENT)
Dept: CARDIAC REHAB | Age: 58
Discharge: HOME | End: 2023-02-09
Payer: COMMERCIAL

## 2023-02-09 VITALS — WEIGHT: 208 LBS | BODY MASS INDEX: 32.58 KG/M2

## 2023-02-09 PROCEDURE — 93798 PHYS/QHP OP CAR RHAB W/ECG: CPT

## 2023-02-10 NOTE — PROGRESS NOTES
Patient: Jamey Stubbs  : 1965    Primary Cardiologist: Veronica Mcclain MD, [facility]  PCP: Eliseo Cerda MD    Today's Date: 2023      ASSESSMENT AND PLAN:     Assessment and Plan:  Hospital Follow Up  - recent hospital admission for NSTEMI -23    2. NSTEMI, acute occlusion of septal artery  - s/p cath 23 with angiographically normal coronary arteries with acute occlusion of septal artery (possible scad) as etiology of AMI  - continue low dose metoprolol, aspirin, plavix   - participating in cardiac rehab - twice per week  - denies symptoms  - inpatient echo with septal defect - will repeat at follow-up with Dr. Rosalba Mcclain    3. HLD  - continue lipitor 20 mg for risk factor reduction and to reduce inflammation  - goal of LDL less than 70  - last LDL was 60 on 2022    4. HTN  - continue metoprolol tartrate 12.5 mg BID, valsartan 80 mg daily  - says BP at cardiac rehab is 110s/70s      5. Obesity  - working on weight loss with diet and exercise      She will follow-up with me in 2023 and Dr. Rosalba Mcclain in May 2023 with echo. She is going on a trip for the month of . ICD-10-CM ICD-9-CM    1. Primary hypertension  I10 401.9       2. Hyperlipidemia, unspecified hyperlipidemia type  E78.5 272.4       3. Hospital discharge follow-up  Z09 V67.59       4. Occlusion of septal branch of left anterior descending (LAD) coronary artery (HCC)  I24.0 410.90 ECHO ADULT COMPLETE      5. Obesity (BMI 30-39. 9)  E66.9 278.00            HISTORY OF PRESENT ILLNESS:     History of Present Illness:    Patient presents to office for hospital follow-up. She was admitted to [facility] from -23 for NSTEMI. LHC was performed and demonstrated acute occlusion of septal artery. Recommended that she should be on DAPT for 6 months. Today she is doing well overall. Feels good, denies symptoms. She is participating in cardiac rehab twice per week and walks her dog most days. Denies exertional symptoms during these activities. Is compliant with medications. She has lost 4lbs and has been following the mediterranean diet. Denies fatigue in the mornings, PND, orthopnea - says she snores but not interested in sleep evaluation. Denies chest pain, edema, syncope, shortness of breath at rest, dyspnea on exertion. Has no tachycardia, palpitations or sense of arrhythmia. PAST MEDICAL HISTORY:     Past Medical History:   Diagnosis Date    CAD (coronary artery disease) 01/23/2023    NSTEMI   SCAD of septal branch of LAD    Hx of abnormal Pap smear 01/01/2012    HPV Positive    Hypertension        Past Surgical History:   Procedure Laterality Date    HX DILATION AND CURETTAGE      HX HYSTEROSCOPY      HX LAPAROSCOPIC SUPRACERVICAL HYSTERECTOMY  2010    HX MYOMECTOMY         CURRENT MEDICATIONS:    .  Current Outpatient Medications   Medication Sig Dispense Refill    aspirin 81 mg chewable tablet Take 1 Tablet by mouth daily for 90 days. May purchase over the counter 30 Tablet 2    clopidogreL (PLAVIX) 75 mg tab Take 1 Tablet by mouth daily for 90 days. 30 Tablet 2    metoprolol tartrate (LOPRESSOR) 25 mg tablet Take 0.5 Tablets by mouth every twelve (12) hours for 90 days. 30 Tablet 2    atorvastatin (LIPITOR) 20 mg tablet TAKE 1 TABLET BY MOUTH EVERYDAY AT BEDTIME      valsartan (DIOVAN) 80 mg tablet          Allergies   Allergen Reactions    Penicillins Other (comments)     Syncope    Other reaction(s):  Other (See Comments)         SOCIAL HISTORY:     Social History     Tobacco Use    Smoking status: Never    Smokeless tobacco: Never   Substance Use Topics    Alcohol use: Never     Comment: occ    Drug use: No       FAMILY HISTORY:     Family History   Problem Relation Age of Onset    Stroke Mother        REVIEW OF SYMPTOMS:     Review of Symptoms:  Negative except as above, all other systems reviewed and are negative for a Comprehensive ROS (10+)    PHYSICAL EXAM:     Physical Exam:  Visit Vitals  /84 (BP 1 Location: Left upper arm, BP Patient Position: Sitting)   Pulse 74   Ht 5' 7\" (1.702 m)   Wt 206 lb 9.6 oz (93.7 kg)   SpO2 94%   BMI 32.36 kg/m²       General appearance: alert, cooperative, no distress, appears stated age  Neck: supple, symmetrical, trachea midline, no adenopathy, thyroid: not enlarged, symmetric, no tenderness/mass/nodules, no carotid bruit, and no JVD  Lungs: clear to auscultation bilaterally  Heart: regular rate and rhythm, S1, S2 normal, no murmur, click, rub or gallop  Extremities: extremities normal, atraumatic, no cyanosis or edema    LABS / OTHER STUDIES:     Lab Results   Component Value Date/Time    Sodium 138 01/25/2023 12:18 AM    Potassium 3.6 01/25/2023 12:18 AM    Chloride 106 01/25/2023 12:18 AM    CO2 25 01/25/2023 12:18 AM    Anion gap 7 01/25/2023 12:18 AM    Glucose 160 (H) 01/25/2023 12:18 AM    BUN 13 01/25/2023 12:18 AM    Creatinine 0.78 01/25/2023 12:18 AM    BUN/Creatinine ratio 17 01/25/2023 12:18 AM    GFR est AA >60 03/28/2022 11:20 AM    GFR est non-AA >60 03/28/2022 11:20 AM    Calcium 9.0 01/25/2023 12:18 AM    Bilirubin, total 0.7 01/23/2023 08:36 AM    Alk. phosphatase 128 (H) 01/23/2023 08:36 AM    Protein, total 7.8 01/23/2023 08:36 AM    Albumin 3.8 01/23/2023 08:36 AM    Globulin 4.0 01/23/2023 08:36 AM    A-G Ratio 1.0 (L) 01/23/2023 08:36 AM    ALT (SGPT) 44 01/23/2023 08:36 AM    AST (SGOT) 21 01/23/2023 08:36 AM       Lab Results   Component Value Date/Time    Cholesterol, total 133 01/24/2023 09:55 AM    HDL Cholesterol 48 01/24/2023 09:55 AM    LDL, calculated 60 01/24/2023 09:55 AM    VLDL, calculated 25 01/24/2023 09:55 AM    Triglyceride 125 01/24/2023 09:55 AM    CHOL/HDL Ratio 2.8 01/24/2023 09:55 AM       CARDIAC DIAGNOSTICS:     Cardiac Evaluation Includes:  I reviewed the test results below.     01/23/23    ECHO ADULT COMPLETE 01/24/2023 1/24/2023    Interpretation Summary    Left Ventricle: Normal left ventricular systolic function with a visually estimated EF of 55 - 60%. Left ventricle size is normal. LVIDd is 5.4 cm. Mildly increased wall thickness. IVSd is 1.0 cm. LVPWd is 1.0 cm. See diagram for wall motion findings. Aortic Valve: Tricuspid valve. Contrast used: Definity.     Signed by: Guillaume Huber MD on 1/24/2023 11:26 AM          01/23/23    CARDIAC PROCEDURE 01/23/2023 1/23/2023    Conclusion  · Angiographically normal coronary arteries with probable acute occlusion of septal (possible scad) as etiology of AMI  · Elevated lvedp      Findings:  L Main: large caliber vessel, angioraphically normal  LAD: large caliber, two moderate diagonals, minimal disease, S1 with hazy 80-90% stenosis (probable culprit for AMI)  LCx: large caliber, continues as large marginal with distal branching, angiographically normal  RCA: large caliber, moderate to large pda and large pl branches, angiographically normal    LVEDP:  20  mmhg    Signed by: Camilo Ashley DO on 1/23/2023  2:50 PM         Lani Linda, [address] [facility]  [address], [address] [place] [address]    Ph: [phone]

## 2023-02-14 ENCOUNTER — OFFICE VISIT (OUTPATIENT)
Dept: CARDIOLOGY CLINIC | Age: 58
End: 2023-02-14
Payer: COMMERCIAL

## 2023-02-14 VITALS
DIASTOLIC BLOOD PRESSURE: 84 MMHG | HEART RATE: 74 BPM | SYSTOLIC BLOOD PRESSURE: 132 MMHG | BODY MASS INDEX: 32.43 KG/M2 | HEIGHT: 67 IN | OXYGEN SATURATION: 94 % | WEIGHT: 206.6 LBS

## 2023-02-14 DIAGNOSIS — E78.5 HYPERLIPIDEMIA, UNSPECIFIED HYPERLIPIDEMIA TYPE: ICD-10-CM

## 2023-02-14 DIAGNOSIS — Z09 HOSPITAL DISCHARGE FOLLOW-UP: ICD-10-CM

## 2023-02-14 DIAGNOSIS — E66.9 OBESITY (BMI 30-39.9): ICD-10-CM

## 2023-02-14 DIAGNOSIS — I24.0: ICD-10-CM

## 2023-02-14 DIAGNOSIS — I10 PRIMARY HYPERTENSION: Primary | ICD-10-CM

## 2023-02-14 PROCEDURE — 99214 OFFICE O/P EST MOD 30 MIN: CPT

## 2023-02-14 PROCEDURE — 3075F SYST BP GE 130 - 139MM HG: CPT

## 2023-02-14 PROCEDURE — 3079F DIAST BP 80-89 MM HG: CPT

## 2023-02-14 NOTE — PROGRESS NOTES
Seda James is a 56 y.o. female    Chief Complaint   Patient presents with    Hospital Follow Up     NSTEMI        Vitals:    02/14/23 1540   BP: 132/84   BP 1 Location: Left upper arm   BP Patient Position: Sitting   Pulse: 74   Height: 5' 7\" (1.702 m)   Weight: 206 lb 9.6 oz (93.7 kg)   SpO2: 94%     Has started cardiac rehab    Chest pain no    SOB some SOB     Dizziness no    Swelling no    Refills no      1. Have you been to the ER, urgent care clinic since your last visit? Hospitalized since your last visit? ED 1/23/2023    2. Have you seen or consulted any other health care providers outside of the [address] [place] [address] since your last visit? Include any pap smears or colon screening.  No

## 2023-02-14 NOTE — LETTER
2/14/2023    Patient: eJssica Jordan   YOB: 1965   Date of Visit: 2/14/2023     Nelli Bennett MD  [facility]  [address] [place] [id number]  Via Fax: [phone]    Dear Nelli Bennett MD,      Thank you for referring Ms. Tiffanie Zhang to [address] [place] [address] for evaluation. My notes for this consultation are attached. If you have questions, please do not hesitate to call me. I look forward to following your patient along with you.       Sincerely,    KWADWO Berger

## 2023-02-16 ENCOUNTER — HOSPITAL ENCOUNTER (OUTPATIENT)
Dept: CARDIAC REHAB | Age: 58
Discharge: HOME | End: 2023-02-16
Payer: COMMERCIAL

## 2023-02-16 VITALS — WEIGHT: 206 LBS | BODY MASS INDEX: 32.26 KG/M2

## 2023-02-16 PROCEDURE — 93798 PHYS/QHP OP CAR RHAB W/ECG: CPT

## 2023-02-17 ENCOUNTER — HOSPITAL ENCOUNTER (OUTPATIENT)
Dept: CARDIAC REHAB | Age: 58
Discharge: HOME | End: 2023-02-17
Payer: COMMERCIAL

## 2023-02-17 VITALS — BODY MASS INDEX: 32.26 KG/M2 | WEIGHT: 206 LBS

## 2023-02-17 PROCEDURE — 93798 PHYS/QHP OP CAR RHAB W/ECG: CPT

## 2023-02-21 ENCOUNTER — HOSPITAL ENCOUNTER (OUTPATIENT)
Dept: CARDIAC REHAB | Age: 58
Discharge: HOME | End: 2023-02-21
Payer: COMMERCIAL

## 2023-02-21 VITALS — BODY MASS INDEX: 32.26 KG/M2 | WEIGHT: 206 LBS

## 2023-02-21 PROCEDURE — 93798 PHYS/QHP OP CAR RHAB W/ECG: CPT

## 2023-02-23 ENCOUNTER — HOSPITAL ENCOUNTER (OUTPATIENT)
Dept: CARDIAC REHAB | Age: 58
Discharge: HOME | End: 2023-02-23
Payer: COMMERCIAL

## 2023-02-23 VITALS — BODY MASS INDEX: 32.26 KG/M2 | WEIGHT: 206 LBS

## 2023-02-23 PROCEDURE — 93798 PHYS/QHP OP CAR RHAB W/ECG: CPT

## 2023-02-28 ENCOUNTER — HOSPITAL ENCOUNTER (OUTPATIENT)
Dept: CARDIAC REHAB | Age: 58
Discharge: HOME | End: 2023-02-28
Payer: COMMERCIAL

## 2023-02-28 VITALS — BODY MASS INDEX: 31.95 KG/M2 | WEIGHT: 204 LBS

## 2023-02-28 PROCEDURE — 93798 PHYS/QHP OP CAR RHAB W/ECG: CPT

## 2023-03-02 ENCOUNTER — HOSPITAL ENCOUNTER (OUTPATIENT)
Dept: CARDIAC REHAB | Age: 58
Discharge: HOME | End: 2023-03-02
Payer: COMMERCIAL

## 2023-03-02 VITALS — WEIGHT: 204 LBS | BODY MASS INDEX: 31.95 KG/M2

## 2023-03-02 PROCEDURE — 93798 PHYS/QHP OP CAR RHAB W/ECG: CPT

## 2023-03-02 PROCEDURE — 93797 PHYS/QHP OP CAR RHAB WO ECG: CPT

## 2023-03-14 ENCOUNTER — HOSPITAL ENCOUNTER (OUTPATIENT)
Dept: CARDIAC REHAB | Age: 58
Discharge: HOME | End: 2023-03-14
Payer: COMMERCIAL

## 2023-03-14 VITALS — WEIGHT: 202 LBS | BODY MASS INDEX: 31.64 KG/M2

## 2023-03-14 PROCEDURE — 93798 PHYS/QHP OP CAR RHAB W/ECG: CPT

## 2023-03-16 ENCOUNTER — HOSPITAL ENCOUNTER (OUTPATIENT)
Dept: CARDIAC REHAB | Age: 58
Discharge: HOME | End: 2023-03-16
Payer: COMMERCIAL

## 2023-03-16 VITALS — BODY MASS INDEX: 31.64 KG/M2 | WEIGHT: 202 LBS

## 2023-03-16 PROCEDURE — 93798 PHYS/QHP OP CAR RHAB W/ECG: CPT

## 2023-03-18 ENCOUNTER — APPOINTMENT (OUTPATIENT)
Dept: GENERAL RADIOLOGY | Age: 58
End: 2023-03-18
Attending: STUDENT IN AN ORGANIZED HEALTH CARE EDUCATION/TRAINING PROGRAM
Payer: COMMERCIAL

## 2023-03-18 ENCOUNTER — HOSPITAL ENCOUNTER (EMERGENCY)
Age: 58
Discharge: HOME | End: 2023-03-18
Attending: STUDENT IN AN ORGANIZED HEALTH CARE EDUCATION/TRAINING PROGRAM
Payer: COMMERCIAL

## 2023-03-18 VITALS
HEART RATE: 76 BPM | SYSTOLIC BLOOD PRESSURE: 118 MMHG | DIASTOLIC BLOOD PRESSURE: 81 MMHG | BODY MASS INDEX: 31.71 KG/M2 | TEMPERATURE: 98 F | RESPIRATION RATE: 17 BRPM | OXYGEN SATURATION: 94 % | WEIGHT: 202 LBS | HEIGHT: 67 IN

## 2023-03-18 DIAGNOSIS — R07.9 CHEST PAIN WITH LOW RISK OF ACUTE CORONARY SYNDROME: Primary | ICD-10-CM

## 2023-03-18 LAB
ALBUMIN SERPL-MCNC: 3.7 G/DL (ref 3.5–5)
ALBUMIN/GLOB SERPL: 0.9 (ref 1.1–2.2)
ALP SERPL-CCNC: 128 U/L (ref 45–117)
ALT SERPL-CCNC: 45 U/L (ref 12–78)
ANION GAP SERPL CALC-SCNC: 6 MMOL/L (ref 5–15)
AST SERPL-CCNC: 23 U/L (ref 15–37)
BASOPHILS # BLD: 0.1 K/UL (ref 0–0.1)
BASOPHILS NFR BLD: 1 % (ref 0–1)
BILIRUB SERPL-MCNC: 0.6 MG/DL (ref 0.2–1)
BNP SERPL-MCNC: 57 PG/ML
BUN SERPL-MCNC: 22 MG/DL (ref 6–20)
BUN/CREAT SERPL: 27 (ref 12–20)
CALCIUM SERPL-MCNC: 9.6 MG/DL (ref 8.5–10.1)
CHLORIDE SERPL-SCNC: 108 MMOL/L (ref 97–108)
CO2 SERPL-SCNC: 26 MMOL/L (ref 21–32)
COMMENT, HOLDF: NORMAL
CREAT SERPL-MCNC: 0.82 MG/DL (ref 0.55–1.02)
DIFFERENTIAL METHOD BLD: NORMAL
EOSINOPHIL # BLD: 0.2 K/UL (ref 0–0.4)
EOSINOPHIL NFR BLD: 2 % (ref 0–7)
ERYTHROCYTE [DISTWIDTH] IN BLOOD BY AUTOMATED COUNT: 12.6 % (ref 11.5–14.5)
GLOBULIN SER CALC-MCNC: 3.9 G/DL (ref 2–4)
GLUCOSE SERPL-MCNC: 182 MG/DL (ref 65–100)
HCT VFR BLD AUTO: 42.8 % (ref 35–47)
HGB BLD-MCNC: 14.3 G/DL (ref 11.5–16)
IMM GRANULOCYTES # BLD AUTO: 0 K/UL (ref 0–0.04)
IMM GRANULOCYTES NFR BLD AUTO: 0 % (ref 0–0.5)
LIPASE SERPL-CCNC: 168 U/L (ref 73–393)
LYMPHOCYTES # BLD: 1.9 K/UL (ref 0.8–3.5)
LYMPHOCYTES NFR BLD: 25 % (ref 12–49)
MCH RBC QN AUTO: 29.9 PG (ref 26–34)
MCHC RBC AUTO-ENTMCNC: 33.4 G/DL (ref 30–36.5)
MCV RBC AUTO: 89.5 FL (ref 80–99)
MONOCYTES # BLD: 0.4 K/UL (ref 0–1)
MONOCYTES NFR BLD: 5 % (ref 5–13)
NEUTS SEG # BLD: 5.2 K/UL (ref 1.8–8)
NEUTS SEG NFR BLD: 67 % (ref 32–75)
NRBC # BLD: 0 K/UL (ref 0–0.01)
NRBC BLD-RTO: 0 PER 100 WBC
PLATELET # BLD AUTO: 286 K/UL (ref 150–400)
PMV BLD AUTO: 11.4 FL (ref 8.9–12.9)
POTASSIUM SERPL-SCNC: 3.8 MMOL/L (ref 3.5–5.1)
PROT SERPL-MCNC: 7.6 G/DL (ref 6.4–8.2)
RBC # BLD AUTO: 4.78 M/UL (ref 3.8–5.2)
SAMPLES BEING HELD,HOLD: NORMAL
SODIUM SERPL-SCNC: 140 MMOL/L (ref 136–145)
TROPONIN I SERPL HS-MCNC: 5 NG/L (ref 0–51)
TROPONIN I SERPL HS-MCNC: 5 NG/L (ref 0–51)
WBC # BLD AUTO: 7.7 K/UL (ref 3.6–11)

## 2023-03-18 PROCEDURE — 84484 ASSAY OF TROPONIN QUANT: CPT

## 2023-03-18 PROCEDURE — 85025 COMPLETE CBC W/AUTO DIFF WBC: CPT

## 2023-03-18 PROCEDURE — 93005 ELECTROCARDIOGRAM TRACING: CPT

## 2023-03-18 PROCEDURE — 96360 HYDRATION IV INFUSION INIT: CPT

## 2023-03-18 PROCEDURE — 83690 ASSAY OF LIPASE: CPT

## 2023-03-18 PROCEDURE — 36415 COLL VENOUS BLD VENIPUNCTURE: CPT

## 2023-03-18 PROCEDURE — 80053 COMPREHEN METABOLIC PANEL: CPT

## 2023-03-18 PROCEDURE — 83880 ASSAY OF NATRIURETIC PEPTIDE: CPT

## 2023-03-18 PROCEDURE — 71046 X-RAY EXAM CHEST 2 VIEWS: CPT

## 2023-03-18 PROCEDURE — 99285 EMERGENCY DEPT VISIT HI MDM: CPT

## 2023-03-18 PROCEDURE — 74011250636 HC RX REV CODE- 250/636

## 2023-03-18 RX ADMIN — SODIUM CHLORIDE 1000 ML: 9 INJECTION, SOLUTION INTRAVENOUS at 14:17

## 2023-03-18 NOTE — ED TRIAGE NOTES
PT ambulatory to ED c/o chest pain x 1 day described as dull pain under L breast, called PCP and told to come to ED.    324 mg ASA taken by pt PTA.  Hx heart attack 1/23/23

## 2023-03-18 NOTE — ED PROVIDER NOTES
Patient is a 51-year-old female with history of hypertension and recent MI presenting with left-sided chest pain. Patient states that she started experiencing a dull, 5 out of 10, nonradiating chest pain about 4 hours prior to arrival.  She states that she had a NSTEMI roughly 2 months ago and is currently on dual antiplatelet therapy. She states that she took 2 aspirin totaling a dose of 325 mg after pain started with some relief. She denies trauma to her chest, shortness of breath, cough, abdominal pain, nausea, vomiting, back pain, calf swelling, and fevers. Chest Pain (Angina)   Pertinent negatives include no abdominal pain, no back pain, no fever, no numbness, no shortness of breath and no weakness.       Past Medical History:   Diagnosis Date    CAD (coronary artery disease) 01/23/2023    NSTEMI   SCAD of septal branch of LAD    Hx of abnormal Pap smear 01/01/2012    HPV Positive    Hypertension        Past Surgical History:   Procedure Laterality Date    HX DILATION AND CURETTAGE      HX HYSTEROSCOPY      HX LAPAROSCOPIC SUPRACERVICAL HYSTERECTOMY  2010    HX MYOMECTOMY           Family History:   Problem Relation Age of Onset    Stroke Mother        Social History     Socioeconomic History    Marital status: SINGLE     Spouse name: Not on file    Number of children: 0    Years of education: 14    Highest education level: Some college, no degree   Occupational History    Not on file   Tobacco Use    Smoking status: Never    Smokeless tobacco: Never   Substance and Sexual Activity    Alcohol use: Never     Comment: occ    Drug use: No    Sexual activity: Yes   Other Topics Concern     Service No    Blood Transfusions No    Caffeine Concern No    Occupational Exposure No    Hobby Hazards No    Sleep Concern No    Stress Concern No    Weight Concern No    Special Diet No    Back Care No    Exercise No    Bike Helmet No    Seat Belt No    Self-Exams No   Social History Narrative    Not on file Social Determinants of Health     Financial Resource Strain: Not on file   Food Insecurity: Not on file   Transportation Needs: Not on file   Physical Activity: Not on file   Stress: Not on file   Social Connections: Not on file   Intimate Partner Violence: Not on file   Housing Stability: Not on file         ALLERGIES: Penicillins    Review of Systems   Constitutional:  Negative for fever. HENT:  Negative for congestion. Eyes:  Negative for pain. Respiratory:  Negative for shortness of breath. Cardiovascular:  Positive for chest pain. Gastrointestinal:  Negative for abdominal pain. Genitourinary:  Negative for dysuria. Musculoskeletal:  Negative for back pain. Skin:  Negative for color change. Neurological:  Negative for weakness and numbness. Psychiatric/Behavioral:  Negative for behavioral problems. Vitals:    03/18/23 1255   BP: 115/67   Pulse: (!) 110   Resp: 16   Temp: 98 °F (36.7 °C)   SpO2: 95%   Weight: 91.6 kg (202 lb)   Height: 5' 7\" (1.702 m)            Physical Exam  Vitals and nursing note reviewed. Constitutional:       General: She is not in acute distress. Appearance: Normal appearance. She is not ill-appearing. HENT:      Head: Normocephalic and atraumatic. Right Ear: External ear normal.      Left Ear: External ear normal.      Nose: Nose normal.      Mouth/Throat:      Mouth: Mucous membranes are moist.      Pharynx: Oropharynx is clear. Eyes:      Extraocular Movements: Extraocular movements intact. Conjunctiva/sclera: Conjunctivae normal.      Pupils: Pupils are equal, round, and reactive to light. Cardiovascular:      Rate and Rhythm: Regular rhythm. Tachycardia present. Pulses: Normal pulses. Heart sounds: Normal heart sounds. Pulmonary:      Effort: Pulmonary effort is normal. No respiratory distress. Breath sounds: Normal breath sounds. Abdominal:      General: Abdomen is flat.  Bowel sounds are normal. There is no distension. Palpations: Abdomen is soft. Tenderness: There is no abdominal tenderness. There is no guarding or rebound. Musculoskeletal:         General: Normal range of motion. Cervical back: Normal range of motion. Skin:     General: Skin is warm and dry. Capillary Refill: Capillary refill takes less than 2 seconds. Neurological:      General: No focal deficit present. Mental Status: She is alert and oriented to person, place, and time. Psychiatric:         Mood and Affect: Mood normal.         Behavior: Behavior normal.        Medical Decision Making  56-year-old female with history of hypertension and recent MI presenting with left-sided chest pain. Patient states that she started experiencing a dull, 5 out of 10, nonradiating chest pain about 4 hours prior to arrival.  She states that she had a NSTEMI roughly 2 months ago and is currently on dual antiplatelet therapy. She states that she took 2 aspirin totaling a dose of 325 mg after pain started with some relief. She denies trauma to her chest, shortness of breath, cough, abdominal pain, nausea, vomiting, back pain, calf swelling, and fevers. Patient is afebrile and vital signs are remarkable for heart rate of 110 but was otherwise within normal limits including satting at 95% on room air. Physical exam shows a well-appearing patient with benign cardiac, pulmonary, and abdominal exams. Mucous membranes moist.  Pulses +2 and symmetrical bilaterally in the radial and TP pulse. Will order CBC, CMP, lipase, troponin, BNP, EKG, and chest x-ray. Will give a liter of fluids. Consider pain meds but patient not experiencing pain at this time. Presentation, management, and disposition were discussed with the attending physician, Dr. Francesca Calderon, who is in agreement with plan of care. 2:11 PM  Change of shift. Care of patient signed over to Dr. Francesca Calderon. Bedside handoff complete. Awaiting labs, imaging.       Amount and/or Complexity of Data Reviewed  Labs: ordered. Radiology: ordered. ECG/medicine tests: ordered.       ED Course as of 23 1342   Sat Mar 18, 2023   1257 EK:55 PM normal sinus rhythm, ventricular 97, normal axis, no ST elevation. [NS]      ED Course User Index  [NS] Cheri Sanz MD       Procedures

## 2023-03-19 LAB
ATRIAL RATE: 97 BPM
CALCULATED P AXIS, ECG09: 52 DEGREES
CALCULATED R AXIS, ECG10: 85 DEGREES
CALCULATED T AXIS, ECG11: 63 DEGREES
DIAGNOSIS, 93000: NORMAL
P-R INTERVAL, ECG05: 168 MS
Q-T INTERVAL, ECG07: 360 MS
QRS DURATION, ECG06: 92 MS
QTC CALCULATION (BEZET), ECG08: 457 MS
VENTRICULAR RATE, ECG03: 97 BPM

## 2023-03-21 ENCOUNTER — HOSPITAL ENCOUNTER (OUTPATIENT)
Dept: CARDIAC REHAB | Age: 58
Discharge: HOME | End: 2023-03-21
Payer: COMMERCIAL

## 2023-03-21 VITALS — WEIGHT: 202 LBS | BODY MASS INDEX: 31.64 KG/M2

## 2023-03-21 PROCEDURE — 93798 PHYS/QHP OP CAR RHAB W/ECG: CPT

## 2023-03-23 ENCOUNTER — HOSPITAL ENCOUNTER (OUTPATIENT)
Dept: CARDIAC REHAB | Age: 58
Discharge: HOME | End: 2023-03-23
Payer: COMMERCIAL

## 2023-03-23 VITALS — WEIGHT: 202 LBS | BODY MASS INDEX: 31.64 KG/M2

## 2023-03-23 PROCEDURE — 93798 PHYS/QHP OP CAR RHAB W/ECG: CPT

## 2023-03-29 ENCOUNTER — OFFICE VISIT (OUTPATIENT)
Dept: CARDIOLOGY CLINIC | Age: 58
End: 2023-03-29

## 2023-03-29 VITALS
HEIGHT: 67 IN | SYSTOLIC BLOOD PRESSURE: 124 MMHG | DIASTOLIC BLOOD PRESSURE: 82 MMHG | OXYGEN SATURATION: 96 % | WEIGHT: 204 LBS | HEART RATE: 86 BPM | BODY MASS INDEX: 32.02 KG/M2

## 2023-03-29 DIAGNOSIS — I24.0: Primary | ICD-10-CM

## 2023-03-29 DIAGNOSIS — I10 PRIMARY HYPERTENSION: ICD-10-CM

## 2023-03-29 DIAGNOSIS — E78.5 HYPERLIPIDEMIA, UNSPECIFIED HYPERLIPIDEMIA TYPE: ICD-10-CM

## 2023-03-29 DIAGNOSIS — E66.9 OBESITY (BMI 30-39.9): ICD-10-CM

## 2023-03-29 RX ORDER — CLOPIDOGREL BISULFATE 75 MG/1
75 TABLET ORAL DAILY
Qty: 30 TABLET | Refills: 3 | Status: SHIPPED | OUTPATIENT
Start: 2023-03-29

## 2023-03-29 RX ORDER — METOPROLOL TARTRATE 25 MG/1
12.5 TABLET, FILM COATED ORAL EVERY 12 HOURS
Qty: 30 TABLET | Refills: 3 | Status: SHIPPED | OUTPATIENT
Start: 2023-03-29

## 2023-03-29 NOTE — LETTER
3/29/2023    Patient: Seda James   YOB: 1965   Date of Visit: 3/29/2023     Jazzy Brady MD  [address] [address]  Via Fax: [phone]    Dear Jazzy Brady MD,      Thank you for referring Ms. Nikolay Rodriguez to [address] [place] [address] for evaluation. My notes for this consultation are attached. If you have questions, please do not hesitate to call me. I look forward to following your patient along with you.       Sincerely,    KWADWO Boss

## 2023-03-29 NOTE — PROGRESS NOTES
Jaiem Epperson is a 56 y.o. female    Chief Complaint   Patient presents with    Follow-up     NSTEMI    Hypertension    Cholesterol Problem       Vitals:    03/29/23 1512   BP: 124/82   BP 1 Location: Left upper arm   BP Patient Position: Sitting   Pulse: 86   Height: 5' 7\" (1.702 m)   Weight: 204 lb (92.5 kg)   SpO2: 96%     Patient states she thinks she is going to stop cardiac rehab in April and just get back to doing more yard work. Patient states she has changed her diet     Chest pain no    SOB no    Dizziness no    Swelling no    Refills no    Covid Vaccinated yes      1. Have you been to the ER, urgent care clinic since your last visit? Hospitalized since your last visit? Ed 3/18/2023 - CP    2. Have you seen or consulted any other health care providers outside of the [address] [place] [address] since your last visit? Include any pap smears or colon screening.  No

## 2023-03-29 NOTE — PROGRESS NOTES
Patient: Karen Epperson  : 1965    Primary Cardiologist: Karen Oliveira MD, [facility]  Last OV: 23  PCP: Dorinda Chairez MD    Today's Date: 3/29/2023      ASSESSMENT AND PLAN:     Assessment and Plan:  Hospital Follow Up  - recent hospital admission for NSTEMI -23    2. NSTEMI, acute occlusion of septal artery  - s/p cath 23 with angiographically normal coronary arteries with acute occlusion of septal artery (possible scad) as etiology of AMI  - continue low dose metoprolol, aspirin, plavix   - participating in cardiac rehab - twice per week  - denies symptoms  - inpatient echo with septal defect - will repeat at follow-up with Dr. Garima Oliveira    3. HLD  - continue lipitor 20 mg for risk factor reduction and to reduce inflammation  - goal of LDL less than 70  - last LDL was 60 on 2022    4. HTN  - continue metoprolol tartrate 12.5 mg BID, valsartan 80 mg daily  - BP well controlled today, does not really take BP at home    5. Obesity  - working on weight loss with diet and exercise      She will follow-up with Dr. Garima Oliveira in May 2023 with echo. She is going on a trip for the month of . ICD-10-CM ICD-9-CM    1. Occlusion of septal branch of left anterior descending (LAD) coronary artery (HCC)  I24.0 410.90 clopidogreL (PLAVIX) 75 mg tab      metoprolol tartrate (LOPRESSOR) 25 mg tablet      2. Primary hypertension  I10 401.9       3. Hyperlipidemia, unspecified hyperlipidemia type  E78.5 272.4       4. Obesity (BMI 30-39. 9)  E66.9 278.00              HISTORY OF PRESENT ILLNESS:     History of Present Illness: Had recent ER visit on 3/18/23 for chest pain with workup negative. Thinks it was related to musculoskeletal pain. Has been doing a lot of yard work recently. Cardiac rehab is going well, however, due to time constraints she thinks she will stop this soon. From previous OV \"Patient presents to office for hospital follow-up.  She was admitted to [facility] from 1/23-1/25/23 for NSTEMI. LHC was performed and demonstrated acute occlusion of septal artery. Recommended that she should be on DAPT for 6 months. Today she is doing well overall. Feels good, denies symptoms. She is participating in cardiac rehab twice per week and walks her dog most days. Denies exertional symptoms during these activities. Is compliant with medications. She has lost 4lbs and has been following the mediterranean diet. Denies fatigue in the mornings, PND, orthopnea - says she snores but not interested in sleep evaluation. Denies chest pain, edema, syncope, shortness of breath at rest, dyspnea on exertion. Has no tachycardia, palpitations or sense of arrhythmia. \"     PAST MEDICAL HISTORY:     Past Medical History:   Diagnosis Date    CAD (coronary artery disease) 01/23/2023    NSTEMI   SCAD of septal branch of LAD    Hx of abnormal Pap smear 01/01/2012    HPV Positive    Hypertension        Past Surgical History:   Procedure Laterality Date    HX DILATION AND CURETTAGE      HX HYSTEROSCOPY      HX LAPAROSCOPIC SUPRACERVICAL HYSTERECTOMY  2010    HX MYOMECTOMY         CURRENT MEDICATIONS:    .  Current Outpatient Medications   Medication Sig Dispense Refill    aspirin 81 mg chewable tablet Take 1 Tablet by mouth daily for 90 days. May purchase over the counter 30 Tablet 2    atorvastatin (LIPITOR) 20 mg tablet TAKE 1 TABLET BY MOUTH EVERYDAY AT BEDTIME      valsartan (DIOVAN) 80 mg tablet       clopidogreL (PLAVIX) 75 mg tab Take 1 Tablet by mouth daily. 30 Tablet 3    metoprolol tartrate (LOPRESSOR) 25 mg tablet Take 0.5 Tablets by mouth every twelve (12) hours. 30 Tablet 3       Allergies   Allergen Reactions    Penicillins Other (comments)     Syncope    Other reaction(s): Other (See Comments)         SOCIAL HISTORY:     Social History     Tobacco Use    Smoking status: Never    Smokeless tobacco: Never   Substance Use Topics    Alcohol use: Never     Comment: occ    Drug use:  No FAMILY HISTORY:     Family History   Problem Relation Age of Onset    Stroke Mother        REVIEW OF SYMPTOMS:     Review of Symptoms:  Negative except as above, all other systems reviewed and are negative for a Comprehensive ROS (10+)    PHYSICAL EXAM:     Physical Exam:  Visit Vitals  /82 (BP 1 Location: Left upper arm, BP Patient Position: Sitting)   Pulse 86   Ht 5' 7\" (1.702 m)   Wt 204 lb (92.5 kg)   SpO2 96%   BMI 31.95 kg/m²         General appearance: alert, cooperative, no distress, appears stated age  Neck: supple, symmetrical, trachea midline, no adenopathy, thyroid: not enlarged, symmetric, no tenderness/mass/nodules, no carotid bruit, and no JVD  Lungs: clear to auscultation bilaterally  Heart: regular rate and rhythm, S1, S2 normal, no murmur, click, rub or gallop  Extremities: extremities normal, atraumatic, no cyanosis or edema    LABS / OTHER STUDIES:     Lab Results   Component Value Date/Time    Sodium 140 03/18/2023 01:00 PM    Potassium 3.8 03/18/2023 01:00 PM    Chloride 108 03/18/2023 01:00 PM    CO2 26 03/18/2023 01:00 PM    Anion gap 6 03/18/2023 01:00 PM    Glucose 182 (H) 03/18/2023 01:00 PM    BUN 22 (H) 03/18/2023 01:00 PM    Creatinine 0.82 03/18/2023 01:00 PM    BUN/Creatinine ratio 27 (H) 03/18/2023 01:00 PM    GFR est AA >60 03/28/2022 11:20 AM    GFR est non-AA >60 03/28/2022 11:20 AM    Calcium 9.6 03/18/2023 01:00 PM    Bilirubin, total 0.6 03/18/2023 01:00 PM    Alk.  phosphatase 128 (H) 03/18/2023 01:00 PM    Protein, total 7.6 03/18/2023 01:00 PM    Albumin 3.7 03/18/2023 01:00 PM    Globulin 3.9 03/18/2023 01:00 PM    A-G Ratio 0.9 (L) 03/18/2023 01:00 PM    ALT (SGPT) 45 03/18/2023 01:00 PM    AST (SGOT) 23 03/18/2023 01:00 PM       Lab Results   Component Value Date/Time    Cholesterol, total 133 01/24/2023 09:55 AM    HDL Cholesterol 48 01/24/2023 09:55 AM    LDL, calculated 60 01/24/2023 09:55 AM    VLDL, calculated 25 01/24/2023 09:55 AM    Triglyceride 125 01/24/2023 09:55 AM    CHOL/HDL Ratio 2.8 01/24/2023 09:55 AM       CARDIAC DIAGNOSTICS:     Cardiac Evaluation Includes:  I reviewed the test results below. 01/23/23    ECHO ADULT COMPLETE 01/24/2023 1/24/2023    Interpretation Summary    Left Ventricle: Normal left ventricular systolic function with a visually estimated EF of 55 - 60%. Left ventricle size is normal. LVIDd is 5.4 cm. Mildly increased wall thickness. IVSd is 1.0 cm. LVPWd is 1.0 cm. See diagram for wall motion findings. Aortic Valve: Tricuspid valve. Contrast used: Definity.     Signed by: Lena Kang MD on 1/24/2023 11:26 AM          01/23/23    CARDIAC PROCEDURE 01/23/2023 1/23/2023    Conclusion  · Angiographically normal coronary arteries with probable acute occlusion of septal (possible scad) as etiology of AMI  · Elevated lvedp      Findings:  L Main: large caliber vessel, angioraphically normal  LAD: large caliber, two moderate diagonals, minimal disease, S1 with hazy 80-90% stenosis (probable culprit for AMI)  LCx: large caliber, continues as large marginal with distal branching, angiographically normal  RCA: large caliber, moderate to large pda and large pl branches, angiographically normal    LVEDP:  20  mmhg    Signed by: Mich Melchor DO on 1/23/2023  2:50 PM         Sofia Schofield, [facility]  [address], [address] [place] [address]    Yolanda Solano     Ph: [phone]

## 2023-05-09 ENCOUNTER — OFFICE VISIT (OUTPATIENT)
Age: 58
End: 2023-05-09
Payer: COMMERCIAL

## 2023-05-09 VITALS — DIASTOLIC BLOOD PRESSURE: 84 MMHG | WEIGHT: 200 LBS | BODY MASS INDEX: 31.32 KG/M2 | SYSTOLIC BLOOD PRESSURE: 122 MMHG

## 2023-05-09 DIAGNOSIS — Z01.419 ENCOUNTER FOR GYNECOLOGICAL EXAMINATION (GENERAL) (ROUTINE) WITHOUT ABNORMAL FINDINGS: Primary | ICD-10-CM

## 2023-05-09 DIAGNOSIS — Z01.419 ENCOUNTER FOR GYNECOLOGICAL EXAMINATION WITHOUT ABNORMAL FINDING: ICD-10-CM

## 2023-05-09 PROCEDURE — 3074F SYST BP LT 130 MM HG: CPT | Performed by: OBSTETRICS & GYNECOLOGY

## 2023-05-09 PROCEDURE — 3079F DIAST BP 80-89 MM HG: CPT | Performed by: OBSTETRICS & GYNECOLOGY

## 2023-05-09 PROCEDURE — 99396 PREV VISIT EST AGE 40-64: CPT | Performed by: OBSTETRICS & GYNECOLOGY

## 2023-05-09 NOTE — PROGRESS NOTES
Crissy Choi is a 56 y.o. female returns for an annual exam     Chief Complaint   Patient presents with    Annual Exam       No LMP recorded. Patient has had a hysterectomy. Problems: no problems  Birth Control: status post hysterectomy. Last Pap: normal obtained about 3 year(s) ago on 5/26/20. She does not have a history of DOUG 2, 3 or cervical cancer. Last Mammogram: had her mammogram today in our office. 1. Have you been to the ER, urgent care clinic, or hospitalized since your last visit? Yes, had minor heart attack Jan. 2022    2. Have you seen or consulted any other health care providers outside of the [address] [place] [address] since your last visit?  No    Examination chaperoned by Kendra Hamm MA.
tenderness present, no inflammatory lesions present, no masses present, no atrophy present  Vagina: normal vaginal vault without central or paravaginal defects, no discharge present, no inflammatory lesions present, no masses present; atrophic changes  Bladder: non-tender to palpation  Urethra: appears normal  Cervix: normal   Uterus: absent  Adnexa: no adnexal tenderness present, no adnexal masses present  Perineum: perineum within normal limits, no evidence of trauma, no rashes or skin lesions present  Anus: anus within normal limits, no hemorrhoids present  Inguinal Lymph Nodes: no lymphadenopathy present    Skin  General Inspection: no rash, no lesions identified    Neurologic/Psychiatric  Mental Status:  Orientation: grossly oriented to person, place and time  Mood and Affect: mood normal, affect appropriate    Assessment:  Routine gynecologic examination  Her current medical status is satisfactory with no evidence of significant gynecologic issues.     Plan:  Counseled re: diet, exercise, healthy lifestyle  Return for yearly wellness visits  Rec screening mammogram    Advised to discuss use a vaginal estrogen with her cardiologist

## 2023-05-15 LAB
CYTOLOGIST CVX/VAG CYTO: NORMAL
CYTOLOGY CVX/VAG DOC CYTO: NORMAL
CYTOLOGY CVX/VAG DOC THIN PREP: NORMAL
DX ICD CODE: NORMAL
HPV GENOTYPE REFLEX: NORMAL
HPV I/H RISK 4 DNA CVX QL PROBE+SIG AMP: NEGATIVE
Lab: NORMAL
OTHER STN SPEC: NORMAL
STAT OF ADQ CVX/VAG CYTO-IMP: NORMAL

## 2023-05-30 ENCOUNTER — PATIENT MESSAGE (OUTPATIENT)
Age: 58
End: 2023-05-30

## 2023-05-30 NOTE — TELEPHONE ENCOUNTER
From: Romel Smith  To: Bobby Weinberg  Sent: 5/30/2023 10:26 AM EDT  Subject: cancel appt on 5/31/23 at 4pm    I cannot make this appointment and will reschedule for July.  Please cancel this appointment

## 2023-05-30 NOTE — TELEPHONE ENCOUNTER
Patient cxl'd echo and follow up and will reschedule after her upcoming trip and accumulates more PTO.

## 2023-07-18 ENCOUNTER — TELEPHONE (OUTPATIENT)
Age: 58
End: 2023-07-18

## 2023-07-18 RX ORDER — CLOPIDOGREL BISULFATE 75 MG/1
75 TABLET ORAL DAILY
Qty: 90 TABLET | Refills: 1 | Status: SHIPPED | OUTPATIENT
Start: 2023-07-18

## 2023-07-18 NOTE — TELEPHONE ENCOUNTER
Pt requested refill    Clopidogrel 75 mg  Metroprolol tartrate 25 mg    [facility] [phone]

## 2023-07-18 NOTE — TELEPHONE ENCOUNTER
Patient came in today and advised that she was experiencing pain on the left side of her chest under her breast.  She stated she advised the technician prior to her Echo testing and was sent to the front office after testing to send a message to her doctors nurse for review. Pt would like a return call at #[phone].

## 2023-09-19 ENCOUNTER — APPOINTMENT (OUTPATIENT)
Facility: HOSPITAL | Age: 58
DRG: 103 | End: 2023-09-19
Payer: COMMERCIAL

## 2023-09-19 ENCOUNTER — HOSPITAL ENCOUNTER (INPATIENT)
Facility: HOSPITAL | Age: 58
LOS: 1 days | Discharge: HOME | DRG: 103 | End: 2023-09-20
Attending: EMERGENCY MEDICINE | Admitting: INTERNAL MEDICINE
Payer: COMMERCIAL

## 2023-09-19 DIAGNOSIS — R29.90 STROKE-LIKE SYMPTOMS: ICD-10-CM

## 2023-09-19 DIAGNOSIS — R27.0 ATAXIA: Primary | ICD-10-CM

## 2023-09-19 LAB
ALBUMIN SERPL-MCNC: 3.5 G/DL (ref 3.5–5)
ALBUMIN/GLOB SERPL: 0.8 (ref 1.1–2.2)
ALP SERPL-CCNC: 136 U/L (ref 45–117)
ALT SERPL-CCNC: 43 U/L (ref 12–78)
ANION GAP SERPL CALC-SCNC: 6 MMOL/L (ref 5–15)
APPEARANCE UR: CLEAR
AST SERPL-CCNC: 24 U/L (ref 15–37)
BACTERIA URNS QL MICRO: NEGATIVE /HPF
BASOPHILS # BLD: 0.1 K/UL (ref 0–0.1)
BASOPHILS NFR BLD: 1 % (ref 0–1)
BILIRUB SERPL-MCNC: 0.6 MG/DL (ref 0.2–1)
BILIRUB UR QL: NEGATIVE
BUN SERPL-MCNC: 16 MG/DL (ref 6–20)
BUN/CREAT SERPL: 20 (ref 12–20)
CALCIUM SERPL-MCNC: 9.6 MG/DL (ref 8.5–10.1)
CHLORIDE SERPL-SCNC: 107 MMOL/L (ref 97–108)
CO2 SERPL-SCNC: 26 MMOL/L (ref 21–32)
COLOR UR: ABNORMAL
COMMENT:: NORMAL
COMMENT:: NORMAL
CREAT SERPL-MCNC: 0.8 MG/DL (ref 0.55–1.02)
DIFFERENTIAL METHOD BLD: NORMAL
EOSINOPHIL # BLD: 0.2 K/UL (ref 0–0.4)
EOSINOPHIL NFR BLD: 2 % (ref 0–7)
EPITH CASTS URNS QL MICRO: ABNORMAL /LPF
ERYTHROCYTE [DISTWIDTH] IN BLOOD BY AUTOMATED COUNT: 12.8 % (ref 11.5–14.5)
GLOBULIN SER CALC-MCNC: 4.2 G/DL (ref 2–4)
GLUCOSE BLD STRIP.AUTO-MCNC: 125 MG/DL (ref 65–117)
GLUCOSE SERPL-MCNC: 131 MG/DL (ref 65–100)
GLUCOSE UR STRIP.AUTO-MCNC: NEGATIVE MG/DL
HCT VFR BLD AUTO: 43 % (ref 35–47)
HGB BLD-MCNC: 14.4 G/DL (ref 11.5–16)
HGB UR QL STRIP: ABNORMAL
HYALINE CASTS URNS QL MICRO: ABNORMAL /LPF (ref 0–2)
IMM GRANULOCYTES # BLD AUTO: 0 K/UL (ref 0–0.04)
IMM GRANULOCYTES NFR BLD AUTO: 0 % (ref 0–0.5)
KETONES UR QL STRIP.AUTO: NEGATIVE MG/DL
LEUKOCYTE ESTERASE UR QL STRIP.AUTO: ABNORMAL
LYMPHOCYTES # BLD: 2.1 K/UL (ref 0.8–3.5)
LYMPHOCYTES NFR BLD: 26 % (ref 12–49)
MCH RBC QN AUTO: 29.6 PG (ref 26–34)
MCHC RBC AUTO-ENTMCNC: 33.5 G/DL (ref 30–36.5)
MCV RBC AUTO: 88.5 FL (ref 80–99)
MONOCYTES # BLD: 0.5 K/UL (ref 0–1)
MONOCYTES NFR BLD: 7 % (ref 5–13)
NEUTS SEG # BLD: 5.1 K/UL (ref 1.8–8)
NEUTS SEG NFR BLD: 64 % (ref 32–75)
NITRITE UR QL STRIP.AUTO: NEGATIVE
NRBC # BLD: 0 K/UL (ref 0–0.01)
NRBC BLD-RTO: 0 PER 100 WBC
PH UR STRIP: 5.5 (ref 5–8)
PLATELET # BLD AUTO: 287 K/UL (ref 150–400)
PMV BLD AUTO: 11.2 FL (ref 8.9–12.9)
POTASSIUM SERPL-SCNC: 3.9 MMOL/L (ref 3.5–5.1)
PROT SERPL-MCNC: 7.7 G/DL (ref 6.4–8.2)
PROT UR STRIP-MCNC: 30 MG/DL
RBC # BLD AUTO: 4.86 M/UL (ref 3.8–5.2)
RBC #/AREA URNS HPF: ABNORMAL /HPF (ref 0–5)
SERVICE CMNT-IMP: ABNORMAL
SODIUM SERPL-SCNC: 139 MMOL/L (ref 136–145)
SP GR UR REFRACTOMETRY: 1.01 (ref 1–1.03)
SPECIMEN HOLD: NORMAL
SPECIMEN HOLD: NORMAL
TROPONIN I SERPL HS-MCNC: 4 NG/L (ref 0–51)
TROPONIN I SERPL HS-MCNC: 4 NG/L (ref 0–51)
UROBILINOGEN UR QL STRIP.AUTO: 0.2 EU/DL (ref 0.2–1)
WBC # BLD AUTO: 8 K/UL (ref 3.6–11)
WBC URNS QL MICRO: ABNORMAL /HPF (ref 0–4)

## 2023-09-19 PROCEDURE — 6360000004 HC RX CONTRAST MEDICATION: Performed by: STUDENT IN AN ORGANIZED HEALTH CARE EDUCATION/TRAINING PROGRAM

## 2023-09-19 PROCEDURE — 36415 COLL VENOUS BLD VENIPUNCTURE: CPT

## 2023-09-19 PROCEDURE — 0042T CT BRAIN PERFUSION: CPT

## 2023-09-19 PROCEDURE — 93005 ELECTROCARDIOGRAM TRACING: CPT | Performed by: STUDENT IN AN ORGANIZED HEALTH CARE EDUCATION/TRAINING PROGRAM

## 2023-09-19 PROCEDURE — 2060000000 HC ICU INTERMEDIATE R&B

## 2023-09-19 PROCEDURE — A9579 GAD-BASE MR CONTRAST NOS,1ML: HCPCS

## 2023-09-19 PROCEDURE — 97161 PT EVAL LOW COMPLEX 20 MIN: CPT

## 2023-09-19 PROCEDURE — 84484 ASSAY OF TROPONIN QUANT: CPT

## 2023-09-19 PROCEDURE — 82962 GLUCOSE BLOOD TEST: CPT

## 2023-09-19 PROCEDURE — 70498 CT ANGIOGRAPHY NECK: CPT

## 2023-09-19 PROCEDURE — 99285 EMERGENCY DEPT VISIT HI MDM: CPT

## 2023-09-19 PROCEDURE — 97535 SELF CARE MNGMENT TRAINING: CPT

## 2023-09-19 PROCEDURE — 97116 GAIT TRAINING THERAPY: CPT

## 2023-09-19 PROCEDURE — 70553 MRI BRAIN STEM W/O & W/DYE: CPT

## 2023-09-19 PROCEDURE — 1100000003 HC PRIVATE W/ TELEMETRY

## 2023-09-19 PROCEDURE — 85025 COMPLETE CBC W/AUTO DIFF WBC: CPT

## 2023-09-19 PROCEDURE — 6360000004 HC RX CONTRAST MEDICATION

## 2023-09-19 PROCEDURE — 6370000000 HC RX 637 (ALT 250 FOR IP)

## 2023-09-19 PROCEDURE — 94761 N-INVAS EAR/PLS OXIMETRY MLT: CPT

## 2023-09-19 PROCEDURE — 81001 URINALYSIS AUTO W/SCOPE: CPT

## 2023-09-19 PROCEDURE — 2580000003 HC RX 258: Performed by: INTERNAL MEDICINE

## 2023-09-19 PROCEDURE — 97165 OT EVAL LOW COMPLEX 30 MIN: CPT

## 2023-09-19 PROCEDURE — 70450 CT HEAD/BRAIN W/O DYE: CPT

## 2023-09-19 PROCEDURE — 80053 COMPREHEN METABOLIC PANEL: CPT

## 2023-09-19 RX ORDER — POLYETHYLENE GLYCOL 3350 17 G/17G
17 POWDER, FOR SOLUTION ORAL DAILY PRN
Status: DISCONTINUED | OUTPATIENT
Start: 2023-09-19 | End: 2023-09-20 | Stop reason: HOSPADM

## 2023-09-19 RX ORDER — ONDANSETRON 2 MG/ML
4 INJECTION INTRAMUSCULAR; INTRAVENOUS EVERY 6 HOURS PRN
Status: DISCONTINUED | OUTPATIENT
Start: 2023-09-19 | End: 2023-09-20 | Stop reason: HOSPADM

## 2023-09-19 RX ORDER — ACETAMINOPHEN 500 MG
500 TABLET ORAL EVERY 4 HOURS PRN
Status: DISCONTINUED | OUTPATIENT
Start: 2023-09-19 | End: 2023-09-20 | Stop reason: HOSPADM

## 2023-09-19 RX ORDER — CLOPIDOGREL BISULFATE 75 MG/1
75 TABLET ORAL DAILY
Status: CANCELLED | OUTPATIENT
Start: 2023-09-19

## 2023-09-19 RX ORDER — SODIUM CHLORIDE 9 MG/ML
INJECTION, SOLUTION INTRAVENOUS PRN
Status: DISCONTINUED | OUTPATIENT
Start: 2023-09-19 | End: 2023-09-20 | Stop reason: HOSPADM

## 2023-09-19 RX ORDER — ATORVASTATIN CALCIUM 20 MG/1
80 TABLET, FILM COATED ORAL NIGHTLY
Status: DISCONTINUED | OUTPATIENT
Start: 2023-09-19 | End: 2023-09-20 | Stop reason: HOSPADM

## 2023-09-19 RX ORDER — M-VIT,TX,IRON,MINS/CALC/FOLIC 27MG-0.4MG
1 TABLET ORAL DAILY
COMMUNITY

## 2023-09-19 RX ORDER — SODIUM CHLORIDE 0.9 % (FLUSH) 0.9 %
5-40 SYRINGE (ML) INJECTION PRN
Status: DISCONTINUED | OUTPATIENT
Start: 2023-09-19 | End: 2023-09-20 | Stop reason: HOSPADM

## 2023-09-19 RX ORDER — ASPIRIN 81 MG/1
81 TABLET, CHEWABLE ORAL DAILY
Status: DISCONTINUED | OUTPATIENT
Start: 2023-09-20 | End: 2023-09-20 | Stop reason: HOSPADM

## 2023-09-19 RX ORDER — SODIUM CHLORIDE 0.9 % (FLUSH) 0.9 %
5-40 SYRINGE (ML) INJECTION EVERY 12 HOURS SCHEDULED
Status: DISCONTINUED | OUTPATIENT
Start: 2023-09-19 | End: 2023-09-20 | Stop reason: HOSPADM

## 2023-09-19 RX ORDER — ENOXAPARIN SODIUM 100 MG/ML
40 INJECTION SUBCUTANEOUS DAILY
Status: DISCONTINUED | OUTPATIENT
Start: 2023-09-19 | End: 2023-09-20 | Stop reason: HOSPADM

## 2023-09-19 RX ORDER — VALSARTAN 80 MG/1
80 TABLET ORAL DAILY
Status: CANCELLED | OUTPATIENT
Start: 2023-09-19

## 2023-09-19 RX ORDER — ASPIRIN 300 MG/1
300 SUPPOSITORY RECTAL DAILY
Status: DISCONTINUED | OUTPATIENT
Start: 2023-09-20 | End: 2023-09-20 | Stop reason: HOSPADM

## 2023-09-19 RX ORDER — LANOLIN ALCOHOL/MO/W.PET/CERES
3 CREAM (GRAM) TOPICAL NIGHTLY PRN
Status: DISCONTINUED | OUTPATIENT
Start: 2023-09-19 | End: 2023-09-20 | Stop reason: HOSPADM

## 2023-09-19 RX ORDER — ONDANSETRON 4 MG/1
4 TABLET, ORALLY DISINTEGRATING ORAL EVERY 8 HOURS PRN
Status: DISCONTINUED | OUTPATIENT
Start: 2023-09-19 | End: 2023-09-20 | Stop reason: HOSPADM

## 2023-09-19 RX ORDER — ASPIRIN 81 MG/1
81 TABLET, CHEWABLE ORAL DAILY
COMMUNITY

## 2023-09-19 RX ADMIN — GADOTERIDOL 18 ML: 279.3 INJECTION, SOLUTION INTRAVENOUS at 15:14

## 2023-09-19 RX ADMIN — Medication 3 MG: at 22:42

## 2023-09-19 RX ADMIN — IOPAMIDOL 140 ML: 755 INJECTION, SOLUTION INTRAVENOUS at 09:22

## 2023-09-19 RX ADMIN — SODIUM CHLORIDE, PRESERVATIVE FREE 10 ML: 5 INJECTION INTRAVENOUS at 12:02

## 2023-09-19 RX ADMIN — ACETAMINOPHEN 500 MG: 500 TABLET, FILM COATED ORAL at 22:42

## 2023-09-19 ASSESSMENT — PAIN DESCRIPTION - DESCRIPTORS: DESCRIPTORS: ACHING

## 2023-09-19 ASSESSMENT — PAIN SCALES - GENERAL: PAINLEVEL_OUTOF10: 3

## 2023-09-19 ASSESSMENT — PAIN DESCRIPTION - LOCATION: LOCATION: BACK

## 2023-09-19 ASSESSMENT — PAIN DESCRIPTION - ORIENTATION: ORIENTATION: MID

## 2023-09-19 ASSESSMENT — PAIN - FUNCTIONAL ASSESSMENT: PAIN_FUNCTIONAL_ASSESSMENT: NONE - DENIES PAIN

## 2023-09-19 NOTE — ED NOTES
9:01 AM  I have evaluated the patient as the Provider in Rapid Medical Evaluation (RME). I have reviewed her vital signs and the triage nurse assessment. I have talked with the patient and any available family and advised that I am the provider in triage and have ordered the appropriate study to initiate their work up based on the clinical presentation during my assessment. I have advised that the patient will be accommodated in the Main ED as soon as possible. I have also requested to contact the triage nurse or myself immediately if the patient experiences any changes in their condition during this brief waiting period. Patient reports that she went to bed last night around 10 am feeling fine and woke up around 5:30 am with dizziness and R arm pain. Patient reprots that she tried to take her dog for a walk and felt like she \"couldn't walk straight\". Denies any associated CP, SOB, fevers, chills, cough. She does not take blood thinners. No history of similar symptoms. Neuro exam intact but given symptoms called Level 2 Code Stroke.     [address]       [place], [place]  09/19/23 9382

## 2023-09-19 NOTE — H&P
Hospitalist Admission Note    NAME: Anmol Hale   :  1965   MRN:  [id number]     Date/Time:  2023 11:39 AM    Patient PCP: Yakelin Mtz MD      CHIEF COMPLAINT:  AMS    HISTORY OF PRESENT ILLNESS:     Anmol Hale is a 56 y.o.  female with history of coronary artery disease with prior MI, hypertension, and recent visit to ENT for ear fullness presenting with acute onset of vertigo. Patient describes a sensation of her body moving. This began early this morning at about 5:30 AM when she woke up and lasted until about 11:00. Associated with nausea without emesis. She had no change in vision or difficulty walking. However if she moves her head quickly she felt like she continued to move to the right. She did not necessarily describe a spinning sensation or that the world was moving in 1 direction or another but she felt like she herself was moving when she was not. This has occurred before in the past.  She denies any hearing impairment. No fever or signs of respiratory illness. In the ER CT of the head showed no abnormalities. However CTA of the head and neck though negative for large vessel occlusion does show a tiny abnormality consistent with possible embolism versus artifact. She is being admitted to the hospital service for further evaluation and treatment of a possible stroke. PAST MEDICAL HISTORY:  Coronary artery disease with prior MI  Hypertension  History of vertigo  History of recent ear fullness.     Past Medical History:   Diagnosis Date    CAD (coronary artery disease) 2023    NSTEMI   SCAD of septal branch of LAD    History of abnormal cervical Pap smear 2012    HPV positive    Hypertension       Past Surgical History:   Procedure Laterality Date    DILATION AND CURETTAGE OF UTERUS      HYSTEROSCOPY      LAPAROSCOPIC HYSTERECTOMY  2010    laparoscopic supracervical    MYOMECTOMY       Social History     Tobacco Use    Smoking status: Never

## 2023-09-19 NOTE — CARE COORDINATION
Initial Case Management Assessment       09/19/23 0544   Service Assessment   Patient Orientation Alert and Oriented   Cognition Alert   History Provided By Patient   Primary [address] Family Members;Friends/Neighbors   Patient's Healthcare Decision Maker is:   (Pt does not have any NOK; Pt wishes for her HDM to be Juan Woodruff- friend- [phone])   PCP Verified by CM Yes  Annalise Goldstein MD)   Last Visit to PCP Within last 6 months   Prior Functional Level Independent in ADLs/IADLs   Current Functional Level Independent in ADLs/IADLs   Can patient return to prior living arrangement Yes   Ability to make needs known: Good   Family able to assist with home care needs: No   Would you like for me to discuss the discharge plan with any other family members/significant others, and if so, who? No   Financial Resources None   [employer] None   Social/Functional History   Lives With Alone   Type of [address] [place] [address] Dr One level   Home Access Stairs to enter with rails   Entrance Stairs - Number of Steps [address] None   ADL Assistance Independent   Homemaking Assistance Independent   Ambulation Assistance Independent   Transfer Assistance Independent   Active  Yes   Occupation Full time employment   Discharge Planning   Type of [address] [place] [address] Dr Prior To Admission None   Potential Assistance Needed N/A   DME Ordered? No   Potential Assistance Purchasing Medications No   Type of Home Care Services None   Patient expects to be discharged to: House   One/Two Story Residence One story   History of falls? 0   Services At/After Discharge   Transition of Care Consult (CM Consult) N/A   Services At/After Discharge None   [employer] Information Provided? No   Mode of Transport at Discharge Other (see comment)  (friends)     Pt was admitted on 09/19/23 for ataxia.  CM met with

## 2023-09-19 NOTE — ED TRIAGE NOTES
Patient arrives to the ED via POV with complaints of right arm pain, dizziness, and nausea that started after waking up. Patient reports going to bed at 2200. Denies vomiting, difficulty with speech, tingling/numbness.     Signs and symptoms: Dizziness   Code Stroke activation time: 7383  Provider at bedside time:  0858  VAN score: Negative  Last Known Well (Time): 2200  Blood Glucose Result/Time: 125   Blood Pressure: 118/77  Anticoagulants (List medications): Plavix, aspirin

## 2023-09-19 NOTE — PROGRESS NOTES
OCCUPATIONAL THERAPY EVALUATION/DISCHARGE  Patient: Yandy Ash (58 y.o. female)  Date: 9/19/2023  Primary Diagnosis: Ataxia [R27.0]         Precautions:                    ASSESSMENT :  Based on the objective data below, the patient presents with independent ADL performance and mobility following presentation to ED with R UE pain, dizziness, and difficulty with ambulation. CT head negative and MRI not yet completed but pt cleared for participation with therapy. She is alert, oriented, and offers excellent participation. She demonstrates intact and symmetrical UE ROM, coordination, and strength and completes serial ADL tasks without difficulty. No LOB noted during standing portions of activity and pt endorses mild dizziness that resolves quickly. BP elevated (see below) but otherwise vitals stable. She denies visual changes and no nystagmus is noted with tracking, head turns, or positional changes. She is receptive to all instruction, including slow-to-rise technique and BE FAST education. PT present at end of session. No further skilled OT services indicated at this time. Patient Vitals:   Pulse BP Patient Position   09/19/23 1340 -- (!) 137/105 Standing   09/19/23 1329 75 (!) 133/93 Semi meredithlers     Functional Outcome Measure: The patient scored 66/66 on the FMA outcome measure which is indicative of no functional impairment in UE. Further skilled acute occupational therapy is not indicated at this time.      PLAN :  Recommend with staff: OOB to chair for all meals; mobility to bathroom for toileting; ADLs as needed    Recommendation for discharge: (in order for the patient to meet his/her long term goals): No skilled occupational therapy    Other factors to consider for discharge: lives alone and high PLOF (works, drives, walks dog)    IF patient discharges home will need the following DME: none       SUBJECTIVE:   Patient stated, [place] had just a little pain in the under part of my arm, I thought maybe

## 2023-09-19 NOTE — ED NOTES
TRANSFER - OUT REPORT:    Verbal report given to [facility] RN on [facility]  being transferred to 3rd floor for routine progression of patient care       Report consisted of patient's Situation, Background, Assessment and   Recommendations(SBAR). Information from the following report(s) Nurse Handoff Report, Index, MAR, Recent Results, Cardiac Rhythm NSR, and Neuro Assessment was reviewed with the receiving nurse. Chili Fall Assessment:    Presents to emergency department  because of falls (Syncope, seizure, or loss of consciousness): No  Age > 70: No  Altered Mental Status, Intoxication with alcohol or substance confusion (Disorientation, impaired judgment, poor safety awaremess, or inability to follow instructions): No  Impaired Mobility: Ambulates or transfers with assistive devices or assistance; Unable to ambulate or transer.: No  Nursing Judgement: No          Lines:   Peripheral IV 09/19/23 Left Antecubital (Active)        Opportunity for questions and clarification was provided.       Patient transported with:  Adina Alexander RN  09/19/23 8747

## 2023-09-19 NOTE — ED NOTES
Stroke Education provided to patient and the following topics were discussed    1. Patients personal risk factors for stroke are hyperlipidemia and hypertension    2. Warning signs of Stroke:        * Sudden numbness or weakness of the face, arm or leg, especially on one side of          The body            * Sudden confusion, trouble speaking or understanding        * Sudden trouble seeing in one or both eyes        * Sudden trouble walking, dizziness, loss of balance or coordination        * Sudden severe headache with no known cause      3. Importance of activation Emergency Medical Services ( 9-1-1 ) immediately if experience any warning signs of stroke. 4. Be sure and schedule a follow-up appointment with your primary care doctor or any specialists as instructed. 5. You must take medicine every day to treat your risk factors for stroke. Be sure to take your medicines exactly as your doctor tells you: no more, no less. Know what your medicines are for , what they do. Anti-thrombotics /anticoagulants can help prevent strokes. You are taking the following medicine(s)  Plavix     6. Smoking and second-hand smoke greatly increase your risk of stroke, cardiovascular disease and death. Smoking history never    7.  Information provided was Verbal Education       Vitaly Coyne RN  09/19/23 7286

## 2023-09-19 NOTE — ED PROVIDER NOTES
[facility] EMERGENCY DEPT  EMERGENCY DEPARTMENT ENCOUNTER      Pt Name: Marie Kebede  MRN: [id number]  [address] [facility] [place] 1965  Date of evaluation: 9/19/2023  Provider: Mateus Villegas MD    [address]       Chief Complaint   Patient presents with    Dizziness         HISTORY OF PRESENT ILLNESS   (Location/Symptom, Timing/Onset, Context/Setting, Quality, Duration, Modifying Factors, Severity)  Note limiting factors. Ms. Fernando Higgins is a 51yo female who presents to the ER with complaints of dizziness and difficulty walking. She said that she felt well when she went to bed last night at 10:30 PM.  She was last normal at 3 AM this morning. She went to the bathroom and felt tired but otherwise okay. She woke up at 530 this morning, she was unsteady and had difficulty walking. She felt like she was falling to the right. She has persisted in having these symptoms. She does report a history of vertigo but states this feels different than her previous vertigo. Her symptoms are made worse when she looks to either side. No numbness, tingling, or weakness of her arms or legs. No changes with her vision or speech. She denies similar symptoms in the past.  She denies any other complaints. Review of External Medical Records:     Nursing Notes were reviewed. REVIEW OF SYSTEMS    (2-9 systems for level 4, 10 or more for level 5)     Review of Systems   Neurological:  Positive for dizziness. Except as noted above the remainder of the review of systems was reviewed and negative.        PAST MEDICAL HISTORY     Past Medical History:   Diagnosis Date    CAD (coronary artery disease) 01/23/2023    NSTEMI   SCAD of septal branch of LAD    History of abnormal cervical Pap smear 01/01/2012    HPV positive    Hypertension          SURGICAL HISTORY       Past Surgical History:   Procedure Laterality Date    DILATION AND CURETTAGE OF UTERUS      HYSTEROSCOPY      LAPAROSCOPIC HYSTERECTOMY  01/01/2010

## 2023-09-20 ENCOUNTER — APPOINTMENT (OUTPATIENT)
Facility: HOSPITAL | Age: 58
DRG: 103 | End: 2023-09-20
Attending: INTERNAL MEDICINE
Payer: COMMERCIAL

## 2023-09-20 VITALS
OXYGEN SATURATION: 99 % | BODY MASS INDEX: 31.39 KG/M2 | HEART RATE: 74 BPM | DIASTOLIC BLOOD PRESSURE: 79 MMHG | SYSTOLIC BLOOD PRESSURE: 130 MMHG | RESPIRATION RATE: 16 BRPM | WEIGHT: 200 LBS | TEMPERATURE: 98.4 F | HEIGHT: 67 IN

## 2023-09-20 PROBLEM — I25.10 CORONARY ARTERY DISEASE: Chronic | Status: ACTIVE | Noted: 2023-09-20

## 2023-09-20 PROBLEM — G43.109 COMPLICATED MIGRAINE: Status: ACTIVE | Noted: 2023-09-20

## 2023-09-20 PROBLEM — R27.0 ATAXIA: Status: RESOLVED | Noted: 2023-09-19 | Resolved: 2023-09-20

## 2023-09-20 PROBLEM — R42 VERTIGO: Status: ACTIVE | Noted: 2023-09-20

## 2023-09-20 LAB
ANION GAP SERPL CALC-SCNC: 8 MMOL/L (ref 5–15)
BUN SERPL-MCNC: 17 MG/DL (ref 6–20)
BUN/CREAT SERPL: 22 (ref 12–20)
CALCIUM SERPL-MCNC: 9.3 MG/DL (ref 8.5–10.1)
CHLORIDE SERPL-SCNC: 108 MMOL/L (ref 97–108)
CHOLEST SERPL-MCNC: 138 MG/DL
CO2 SERPL-SCNC: 25 MMOL/L (ref 21–32)
CREAT SERPL-MCNC: 0.76 MG/DL (ref 0.55–1.02)
ECHO BSA: 2.07 M2
EKG ATRIAL RATE: 72 BPM
EKG DIAGNOSIS: NORMAL
EKG P AXIS: 63 DEGREES
EKG P-R INTERVAL: 174 MS
EKG Q-T INTERVAL: 396 MS
EKG QRS DURATION: 88 MS
EKG QTC CALCULATION (BAZETT): 433 MS
EKG R AXIS: 92 DEGREES
EKG T AXIS: 75 DEGREES
EKG VENTRICULAR RATE: 72 BPM
ERYTHROCYTE [DISTWIDTH] IN BLOOD BY AUTOMATED COUNT: 12.9 % (ref 11.5–14.5)
EST. AVERAGE GLUCOSE BLD GHB EST-MCNC: 126 MG/DL
GLUCOSE SERPL-MCNC: 130 MG/DL (ref 65–100)
HBA1C MFR BLD: 6 % (ref 4–5.6)
HCT VFR BLD AUTO: 43.2 % (ref 35–47)
HDLC SERPL-MCNC: 51 MG/DL
HDLC SERPL: 2.7 (ref 0–5)
HGB BLD-MCNC: 14.1 G/DL (ref 11.5–16)
LDLC SERPL CALC-MCNC: 63 MG/DL (ref 0–100)
MCH RBC QN AUTO: 29 PG (ref 26–34)
MCHC RBC AUTO-ENTMCNC: 32.6 G/DL (ref 30–36.5)
MCV RBC AUTO: 88.9 FL (ref 80–99)
NRBC # BLD: 0 K/UL (ref 0–0.01)
NRBC BLD-RTO: 0 PER 100 WBC
PLATELET # BLD AUTO: 250 K/UL (ref 150–400)
PMV BLD AUTO: 11.5 FL (ref 8.9–12.9)
POTASSIUM SERPL-SCNC: 4 MMOL/L (ref 3.5–5.1)
RBC # BLD AUTO: 4.86 M/UL (ref 3.8–5.2)
SODIUM SERPL-SCNC: 141 MMOL/L (ref 136–145)
TRIGL SERPL-MCNC: 120 MG/DL
TSH SERPL DL<=0.05 MIU/L-ACNC: 1.61 UIU/ML (ref 0.36–3.74)
VLDLC SERPL CALC-MCNC: 24 MG/DL
WBC # BLD AUTO: 9.1 K/UL (ref 3.6–11)

## 2023-09-20 PROCEDURE — 94761 N-INVAS EAR/PLS OXIMETRY MLT: CPT

## 2023-09-20 PROCEDURE — 85027 COMPLETE CBC AUTOMATED: CPT

## 2023-09-20 PROCEDURE — 93308 TTE F-UP OR LMTD: CPT

## 2023-09-20 PROCEDURE — 6370000000 HC RX 637 (ALT 250 FOR IP): Performed by: INTERNAL MEDICINE

## 2023-09-20 PROCEDURE — 83036 HEMOGLOBIN GLYCOSYLATED A1C: CPT

## 2023-09-20 PROCEDURE — 36415 COLL VENOUS BLD VENIPUNCTURE: CPT

## 2023-09-20 PROCEDURE — 93010 ELECTROCARDIOGRAM REPORT: CPT | Performed by: STUDENT IN AN ORGANIZED HEALTH CARE EDUCATION/TRAINING PROGRAM

## 2023-09-20 PROCEDURE — 84443 ASSAY THYROID STIM HORMONE: CPT

## 2023-09-20 PROCEDURE — 99223 1ST HOSP IP/OBS HIGH 75: CPT | Performed by: NURSE PRACTITIONER

## 2023-09-20 PROCEDURE — 80048 BASIC METABOLIC PNL TOTAL CA: CPT

## 2023-09-20 PROCEDURE — 80061 LIPID PANEL: CPT

## 2023-09-20 RX ORDER — BUTALBITAL, ACETAMINOPHEN AND CAFFEINE 300; 40; 50 MG/1; MG/1; MG/1
1 CAPSULE ORAL EVERY 6 HOURS PRN
Qty: 20 CAPSULE | Refills: 0 | Status: SHIPPED | OUTPATIENT
Start: 2023-09-20

## 2023-09-20 RX ADMIN — ASPIRIN 81 MG: 81 TABLET, CHEWABLE ORAL at 10:54

## 2023-09-20 ASSESSMENT — PAIN SCALES - GENERAL: PAINLEVEL_OUTOF10: 0

## 2023-09-20 NOTE — PROGRESS NOTES
Patient Passed the henry Swallow Screen and is on a regular diet. No c/o speech or swallowing issues. SLP evaluation deferred at this time.

## 2023-09-20 NOTE — CARE COORDINATION
9/20/2023.  10:59 AM     09/20/23 105   Service Assessment   Patient Orientation Alert and Oriented   Discharge Planning   Patient expects to be discharged to: [facility]   Mode of Transport at Discharge Self  (Friend)   Hospital Transport Time of Discharge 1200     Pt emergently admitted 9/19/23 for ataxia, SLP deferred, PT/OT evals completed no skilled needs, pt discussed in IDR, pt stable for DC to home today, PT to meet w/ pt to provide education.     Pt up Ad Kami, requesting DC prior to 12:00 PM as she has friend who is available to transport her home   Nursing updated  There are no CM DC needs   Mni Gipson

## 2023-09-20 NOTE — PROGRESS NOTES
0715: Bedside and Verbal shift change report given to Leonard Almendarez RN/Demetrius RN (oncoming nurse) by Neal Martínez RN (offgoing nurse). Report included the following information Nurse Handoff Report, Adult Overview, Intake/Output, MAR, and Cardiac Rhythm NSR .

## 2023-09-20 NOTE — PROGRESS NOTES
Bedside and Verbal shift change report given to BHAVIN RN/ ELIN RN  (oncoming nurse) by Ge Gannon RN (offgoing nurse).  Report included the following information Nurse Handoff Report, Adult Overview, Recent Results, Med Rec Status, and Cardiac Rhythm SR .

## 2023-09-20 NOTE — CONSULTS
[facility]: [address] [facility]    [place], [address] [facility]  [facility]   [phone]        Name:   Kateryna Shi record #: [id number]  Admission Date: 9/19/2023     Who Consulted: Dr. Stacy Robbins    Reason for Consult:  Ataxia    HISTORY OF PRESENT ILLNESS:     This is a 56 y.o. female who is admitted for altered mental status. Ms. Eriberto Perez presented to the ED on 9/19/2023 after waking up with dizziness and right arm pain. She reported to the ED provider that she tried to take her dog for a walk and felt like she could not walk straight. In discussion with Ms. Eriberto Perez, she tells me that she noticed on Monday that she had started to feel increased fatigue and shortness of breath after shoveling sand but the symptoms went away, Tuesday morning she woke up and became dizzy after sitting up, she got up and went about her day including walking her dog when she noticed that she felt like she was being pulled to the right and was having nausea. The symptoms lasted approximately 3 to 4 hours before resolving without intervention upon arrival the provider found her exam to be nonfocal and her blood pressure was 118/77. Review of admission labs did show a normal CMP, urine, TSH and CBC the Neurology Service is asked to evaluate for posterior stroke. Review of medical record shows that Ms. Lynn Shipley has not been seen by [facility] neurology team previously. She does report that she has had episodes of vertigo in the past and of migraine and that this did not feel like her normal vertiginous symptoms. Neuro-imaging:     CT Head: No acute process    CTA Head and Neck: No LVO or carotid stenosis    EKG: normal sinus rhythm. Care Plan discussed with:  Patient x   Family    RN    Care Manager    Primary Team Provider    Consultant/Specialist        Impression/ Plan:      1.   Complex migraine:    Resolved  Should be discharged home with script for

## 2023-09-20 NOTE — DISCHARGE INSTRUCTIONS
HOSPITALIST DISCHARGE INSTRUCTIONS  NAME: Samm Bal   :  1965   MRN:  [id number]     Date/Time:  2023 2:17 PM    ADMIT DATE: 2023     DISCHARGE DATE: 2023     ADMISSION DIAGNOSIS:  Ataxia      DISCHARGE DIAGNOSIS:  Migraine with aura  Vertigo      DIET:  heart healthy    ACTIVITY:  as tolerated    OTHER INSTRUCTIONS:    Follow up with ENT if vertigo persists      MEDICATIONS:      It is important that you take the medication exactly as they are prescribed. Keep your medication in the bottles provided by the pharmacist and keep a list of the medication names, dosages, and times to be taken in your wallet. Do not take other medications without consulting your doctor. If you experience any of the following symptoms then please call your primary care physician or return to the emergency room if you cannot get hold of your doctor:  Fever, chills, nausea, vomiting, diarrhea, change in mentation, falling, bleeding, shortness of breath    Follow Up:  Please call and set up an appointment to see them in 1 week. Chelsy Irving MD  [address] [place] [address]  [phone]    Follow up in 1 week(s)          Information obtained by :  I understand that if any problems occur once I am at home I am to contact my physician. I understand and acknowledge receipt of the instructions indicated above.                                                                                                                                                Physician's or R.N.'s Signature                                                                  Date/Time                                                                                                                                              Patient or Representative Signature                                                          Date/Time

## 2023-09-20 NOTE — PROGRESS NOTES
At 2119 made Natasha Boston NP aware that home med list completed, patient requests night dose of metoprolol 12.5 mg po.  already took 12.5 mg this am. Also requests melatonin for sleep and tylenol for back pain    NP asked for recent BP this was SBP  <100, so metoprolol not ordered, NP did order melatonin and tylenol and these were given

## 2024-04-01 RX ORDER — CLOPIDOGREL BISULFATE 75 MG/1
75 TABLET ORAL DAILY
Qty: 30 TABLET | Refills: 0 | Status: SHIPPED | OUTPATIENT
Start: 2024-04-01

## 2024-04-15 RX ORDER — CLOPIDOGREL BISULFATE 75 MG/1
75 TABLET ORAL DAILY
Qty: 30 TABLET | Refills: 1 | Status: SHIPPED | OUTPATIENT
Start: 2024-04-15

## 2024-04-23 ENCOUNTER — OFFICE VISIT (OUTPATIENT)
Age: 59
End: 2024-04-23
Payer: COMMERCIAL

## 2024-04-23 VITALS
SYSTOLIC BLOOD PRESSURE: 111 MMHG | WEIGHT: 205 LBS | DIASTOLIC BLOOD PRESSURE: 78 MMHG | HEART RATE: 75 BPM | BODY MASS INDEX: 32.11 KG/M2

## 2024-04-23 DIAGNOSIS — Z01.419 ENCOUNTER FOR GYNECOLOGICAL EXAMINATION WITHOUT ABNORMAL FINDING: Primary | ICD-10-CM

## 2024-04-23 PROCEDURE — 3074F SYST BP LT 130 MM HG: CPT | Performed by: STUDENT IN AN ORGANIZED HEALTH CARE EDUCATION/TRAINING PROGRAM

## 2024-04-23 PROCEDURE — 99396 PREV VISIT EST AGE 40-64: CPT | Performed by: STUDENT IN AN ORGANIZED HEALTH CARE EDUCATION/TRAINING PROGRAM

## 2024-04-23 PROCEDURE — 3078F DIAST BP <80 MM HG: CPT | Performed by: STUDENT IN AN ORGANIZED HEALTH CARE EDUCATION/TRAINING PROGRAM

## 2024-04-23 NOTE — PROGRESS NOTES
Bernarda Sims is a 58 y.o. female returns for an annual exam     Chief Complaint   Patient presents with    Annual Exam       No LMP recorded. Patient has had a hysterectomy.  Her periods are absent.  Problems: no problems  Birth Control: status post hysterectomy.  Last Pap: normal obtained 11 month(s) ago on 5/9/23.  She does not have a history of DOUG 2, 3 or cervical cancer.   Last Mammogram: had her mammogram today in our office.      Examination chaperoned by Hazel Vázquez MA.

## 2024-04-23 NOTE — PROGRESS NOTES
Annual exam ages 40-64      Bernarda Sims is a ,  58 y.o. female   No LMP recorded. Patient has had a hysterectomy.    She presents for her annual checkup.     She is having no problems.    Menstrual status:    No bleeding since 2010    Hormonal status:  She reports no perimenstrual type symptoms.   She is not having vasomotor symptoms.  The patient is not using any ERT.    Sexual history:    She  reports that she is not currently sexually active and has had partner(s) who are male. She reports using the following method of birth control/protection: None.    Medical conditions:    Since her last annual GYN exam about 1 year ago, she has not the following changes in her health history: none.     Surgical history confirmed with patient.  has a past surgical history that includes Dilation and curettage of uterus; hysteroscopy; Laparoscopic hysterectomy (2010); and myomectomy.    Pap and Mammogram History:    Her most recent Pap smear was normal, obtained 1 year(s) ago.    The patient had her mammogram today in our office.    Breast Cancer History/Substance Abuse: negative      Osteoporosis History:    Family history does not include a first or second degree relative with osteopenia or osteoporosis.    Past Medical History:   Diagnosis Date    CAD (coronary artery disease) 2023    NSTEMI   SCAD of septal branch of LAD    History of abnormal cervical Pap smear 2012    HPV positive    Hypertension      Past Surgical History:   Procedure Laterality Date    DILATION AND CURETTAGE OF UTERUS      HYSTEROSCOPY      LAPAROSCOPIC HYSTERECTOMY  2010    laparoscopic supracervical    MYOMECTOMY         Current Outpatient Medications   Medication Sig Dispense Refill    diphenhydrAMINE HCl (BENADRYL ALLERGY PO) Take by mouth      Cetirizine HCl (ZYRTEC ALLERGY PO) Take by mouth      clopidogrel (PLAVIX) 75 MG tablet Take 1 tablet by mouth daily NEEDS appointment for refills 30 tablet 1

## 2024-06-11 ENCOUNTER — OFFICE VISIT (OUTPATIENT)
Age: 59
End: 2024-06-11
Payer: COMMERCIAL

## 2024-06-11 VITALS
DIASTOLIC BLOOD PRESSURE: 70 MMHG | OXYGEN SATURATION: 95 % | WEIGHT: 202 LBS | HEART RATE: 69 BPM | BODY MASS INDEX: 31.71 KG/M2 | HEIGHT: 67 IN | SYSTOLIC BLOOD PRESSURE: 110 MMHG

## 2024-06-11 DIAGNOSIS — I25.10 CORONARY ARTERY DISEASE INVOLVING NATIVE CORONARY ARTERY OF NATIVE HEART WITHOUT ANGINA PECTORIS: ICD-10-CM

## 2024-06-11 DIAGNOSIS — I10 PRIMARY HYPERTENSION: Primary | ICD-10-CM

## 2024-06-11 DIAGNOSIS — E66.01 CLASS 2 SEVERE OBESITY DUE TO EXCESS CALORIES WITH SERIOUS COMORBIDITY IN ADULT, UNSPECIFIED BMI (HCC): ICD-10-CM

## 2024-06-11 DIAGNOSIS — E78.2 MIXED HYPERLIPIDEMIA: ICD-10-CM

## 2024-06-11 PROCEDURE — 3074F SYST BP LT 130 MM HG: CPT | Performed by: SPECIALIST

## 2024-06-11 PROCEDURE — 99214 OFFICE O/P EST MOD 30 MIN: CPT | Performed by: SPECIALIST

## 2024-06-11 PROCEDURE — 3078F DIAST BP <80 MM HG: CPT | Performed by: SPECIALIST

## 2024-06-11 RX ORDER — OMEGA-3S/DHA/EPA/FISH OIL/D3 300MG-1000
400 CAPSULE ORAL DAILY
COMMUNITY

## 2024-06-11 RX ORDER — UBIDECARENONE 75 MG
50 CAPSULE ORAL DAILY
COMMUNITY

## 2024-06-11 RX ORDER — DAPAGLIFLOZIN 10 MG/1
10 TABLET, FILM COATED ORAL EVERY MORNING
COMMUNITY

## 2024-06-11 NOTE — PROGRESS NOTES
6/11/2024)    butalbital-APAP-caffeine -40 MG CAPS per capsule Take 1 capsule by mouth every 6 hours as needed for Migraine (Patient not taking: Reported on 6/11/2024)     No current facility-administered medications for this visit.       VITAL SIGNS  Wt Readings from Last 3 Encounters:   06/11/24 91.6 kg (202 lb)   04/23/24 93 kg (205 lb)   09/20/23 90.7 kg (200 lb)     BP Readings from Last 3 Encounters:   06/11/24 110/70   04/23/24 111/78   09/20/23 130/79     Pulse Readings from Last 3 Encounters:   06/11/24 69   04/23/24 75   09/20/23 74         SPECIALTY COMMENTS  1. Lipids:  9/20/23- , HDL 53, LDL 63,     2. Echo  1/23/23-EF 55 - 60%, Tricuspid AV, Contrast used: Definity.  7/18/23- EF 55 - 60%, mild MR  9/20/23-Limited-·  Interatrial Septum: Agitated saline study was negative with and without provocation.    3. Cardiac Cath  1/23/23-· Angiographically normal coronary arteries with probable acute occlusion of septal (possible scad) as etiology of AMI  · Elevated lvedp  Findings:   L Main: large caliber vessel, angioraphically normal  LAD: large caliber, two moderate diagonals, minimal disease, S1 with hazy 80-90% stenosis (probable culprit for AMI)  LCx: large caliber, continues as large marginal with distal branching, angiographically normal  RCA: large caliber, moderate to large pda and large pl branches, angiographically normal  LVEDP:  20  mmhg        
[phone] Fax

## 2024-06-11 NOTE — PATIENT INSTRUCTIONS

## 2024-08-28 ENCOUNTER — TELEPHONE (OUTPATIENT)
Age: 59
End: 2024-08-28

## 2024-08-28 NOTE — TELEPHONE ENCOUNTER
Patient stopped taking plavix 75mg as directed by the doctor, patient stated she has been feeling sluggish and night sweats, since she has stopped the medication, requested to speak with the nurse.           Pt# [phone]

## 2024-08-28 NOTE — TELEPHONE ENCOUNTER
Asked pt to get home BP to see if running low. Dr Banegas stated in last OV note that he would lower Valsartan to 40 mg if BP running low at home.

## 2025-04-01 ENCOUNTER — TRANSCRIBE ORDERS (OUTPATIENT)
Facility: HOSPITAL | Age: 60
End: 2025-04-01

## 2025-04-01 DIAGNOSIS — E04.1 NONTOXIC UNINODULAR GOITER: Primary | ICD-10-CM

## 2025-04-11 ENCOUNTER — HOSPITAL ENCOUNTER (OUTPATIENT)
Facility: HOSPITAL | Age: 60
Discharge: HOME | End: 2025-04-14
Payer: COMMERCIAL

## 2025-04-11 DIAGNOSIS — E04.1 NONTOXIC UNINODULAR GOITER: ICD-10-CM

## 2025-04-11 PROCEDURE — 6360000002 HC RX W HCPCS: Performed by: PHYSICIAN ASSISTANT

## 2025-04-11 PROCEDURE — 88173 CYTOPATH EVAL FNA REPORT: CPT

## 2025-04-11 PROCEDURE — 10005 FNA BX W/US GDN 1ST LES: CPT

## 2025-04-11 PROCEDURE — 88172 CYTP DX EVAL FNA 1ST EA SITE: CPT

## 2025-04-11 RX ORDER — LIDOCAINE HYDROCHLORIDE 10 MG/ML
10 INJECTION, SOLUTION EPIDURAL; INFILTRATION; INTRACAUDAL; PERINEURAL ONCE
Status: COMPLETED | OUTPATIENT
Start: 2025-04-11 | End: 2025-04-11

## 2025-04-11 RX ADMIN — LIDOCAINE HYDROCHLORIDE 10 ML: 10 INJECTION, SOLUTION EPIDURAL; INFILTRATION; INTRACAUDAL; PERINEURAL at 09:27

## 2025-05-01 ENCOUNTER — OFFICE VISIT (OUTPATIENT)
Age: 60
End: 2025-05-01
Payer: COMMERCIAL

## 2025-05-01 VITALS
OXYGEN SATURATION: 93 % | WEIGHT: 205 LBS | DIASTOLIC BLOOD PRESSURE: 68 MMHG | BODY MASS INDEX: 32.18 KG/M2 | HEART RATE: 62 BPM | HEIGHT: 67 IN | SYSTOLIC BLOOD PRESSURE: 102 MMHG

## 2025-05-01 DIAGNOSIS — E78.2 MIXED HYPERLIPIDEMIA: ICD-10-CM

## 2025-05-01 DIAGNOSIS — I10 PRIMARY HYPERTENSION: Primary | ICD-10-CM

## 2025-05-01 DIAGNOSIS — I25.10 CORONARY ARTERY DISEASE INVOLVING NATIVE CORONARY ARTERY OF NATIVE HEART WITHOUT ANGINA PECTORIS: ICD-10-CM

## 2025-05-01 PROCEDURE — 3078F DIAST BP <80 MM HG: CPT | Performed by: INTERNAL MEDICINE

## 2025-05-01 PROCEDURE — 3074F SYST BP LT 130 MM HG: CPT | Performed by: INTERNAL MEDICINE

## 2025-05-01 PROCEDURE — 99214 OFFICE O/P EST MOD 30 MIN: CPT | Performed by: INTERNAL MEDICINE

## 2025-05-01 NOTE — PROGRESS NOTES
Chief Complaint   Patient presents with    Hypertension    Coronary Artery Disease     Vitals:    05/01/25 1001   BP: 102/68   BP Site: Left Upper Arm   Patient Position: Sitting   Pulse: 62   SpO2: 93%   Weight: 93 kg (205 lb)   Height: 1.702 m (5' 7\")         Chest pain: DENIED     Recent hospital stays: DENIED     Refills: DENIED     Patient states that she is concerned with her arms and hands falling asleep when she is sleeping. States that this does wake her up in the night.

## 2025-05-01 NOTE — PROGRESS NOTES
Office Follow-up    NAME: Bernarda Sims   :  1965  MRM:  [id number]    Date:  2025         Assessment and Plan:        SCAD of septal branch ()- placed on Plavix for scad. Plavix Dced in .  Now on ASA 81 and Statins.     2. Dyslipidemia: -LDL 63 as of 2023. Continue Atorva    3.  HTN  -Metopropol and Diovan    4. Obesity BMI 32  -Encourage weight loss    5. DM: Now on Farxiga.     Follow-up in 1 year                     ATTENTION:   This medical record was transcribed using an electronic medical records/speech recognition system.  Although proofread, it may and can contain electronic, spelling and other errors.  Corrections may be executed at a later time.  Please feel free to contact us for any clarifications as needed.      Subjective:     Bernarda Sims, a 59 y.o. year-old who presents for followup.    Exam:     /68 (BP Site: Left Upper Arm, Patient Position: Sitting)   Pulse 62   Ht 1.702 m (5' 7\")   Wt 93 kg (205 lb)   SpO2 93%   BMI 32.11 kg/m²      General appearance - alert, well appearing, and in no distress  Mental status - affect appropriate to mood  Eyes - sclera anicteric, moist mucous membranes  Neck - supple, no significant adenopathy    Medications:     Current Outpatient Medications   Medication Sig    Ascorbic Acid (VITAMIN C PO) Take by mouth    MAGNESIUM PO Take by mouth    CRANBERRY PO Take by mouth    BIOTIN PO Take by mouth    metoprolol tartrate (LOPRESSOR) 25 MG tablet Take 0.5 tablets by mouth 2 times daily    dapagliflozin (FARXIGA) 10 MG tablet Take 1 tablet by mouth every morning    vitamin D3 (CHOLECALCIFEROL) 10 MCG (400 UNIT) TABS tablet Take 1 tablet by mouth daily    vitamin B-12 (CYANOCOBALAMIN) 100 MCG tablet Take 0.5 tablets by mouth daily    Cetirizine HCl (ZYRTEC ALLERGY PO) Take by mouth (Patient taking differently: Take by mouth as needed)    aspirin 81 MG chewable tablet Take 1 tablet by mouth daily    Multiple Vitamins-Minerals

## 2025-05-29 ENCOUNTER — RESULTS FOLLOW-UP (OUTPATIENT)
Age: 60
End: 2025-05-29

## 2025-05-29 ENCOUNTER — OFFICE VISIT (OUTPATIENT)
Age: 60
End: 2025-05-29
Payer: COMMERCIAL

## 2025-05-29 VITALS
BODY MASS INDEX: 32.02 KG/M2 | WEIGHT: 204 LBS | HEART RATE: 118 BPM | HEIGHT: 67 IN | DIASTOLIC BLOOD PRESSURE: 73 MMHG | SYSTOLIC BLOOD PRESSURE: 109 MMHG

## 2025-05-29 DIAGNOSIS — Z01.419 ENCOUNTER FOR WELL WOMAN EXAM: Primary | ICD-10-CM

## 2025-05-29 DIAGNOSIS — Z11.51 SCREENING FOR HUMAN PAPILLOMAVIRUS: ICD-10-CM

## 2025-05-29 DIAGNOSIS — Z12.4 CERVICAL CANCER SCREENING: ICD-10-CM

## 2025-05-29 PROCEDURE — 3078F DIAST BP <80 MM HG: CPT | Performed by: STUDENT IN AN ORGANIZED HEALTH CARE EDUCATION/TRAINING PROGRAM

## 2025-05-29 PROCEDURE — 3074F SYST BP LT 130 MM HG: CPT | Performed by: STUDENT IN AN ORGANIZED HEALTH CARE EDUCATION/TRAINING PROGRAM

## 2025-05-29 PROCEDURE — 99396 PREV VISIT EST AGE 40-64: CPT | Performed by: STUDENT IN AN ORGANIZED HEALTH CARE EDUCATION/TRAINING PROGRAM

## 2025-05-29 SDOH — ECONOMIC STABILITY: FOOD INSECURITY: WITHIN THE PAST 12 MONTHS, THE FOOD YOU BOUGHT JUST DIDN'T LAST AND YOU DIDN'T HAVE MONEY TO GET MORE.: NEVER TRUE

## 2025-05-29 SDOH — ECONOMIC STABILITY: FOOD INSECURITY: WITHIN THE PAST 12 MONTHS, YOU WORRIED THAT YOUR FOOD WOULD RUN OUT BEFORE YOU GOT MONEY TO BUY MORE.: NEVER TRUE

## 2025-05-29 ASSESSMENT — PATIENT HEALTH QUESTIONNAIRE - PHQ9
SUM OF ALL RESPONSES TO PHQ QUESTIONS 1-9: 0
SUM OF ALL RESPONSES TO PHQ QUESTIONS 1-9: 0
1. LITTLE INTEREST OR PLEASURE IN DOING THINGS: NOT AT ALL
2. FEELING DOWN, DEPRESSED OR HOPELESS: NOT AT ALL
SUM OF ALL RESPONSES TO PHQ QUESTIONS 1-9: 0
SUM OF ALL RESPONSES TO PHQ QUESTIONS 1-9: 0

## 2025-05-29 NOTE — PROGRESS NOTES
Annual exam ages 40-64      Bernarda Sims is a ,  59 y.o. female   No LMP recorded. Patient has had a hysterectomy.    She presents for her annual checkup.     She is having no problems.      Menstrual status:    No bleeding x ANAM   She reports no premenstrual symptoms.      Hormonal status:  She reports no perimenstrual type symptoms.   She is sporadic having vasomotor symptoms.  The patient is not using any ERT.    Sexual history:    She  reports that she is not currently sexually active and has had partner(s) who are male. She reports using the following method of birth control/protection: None.    Medical conditions:    Since her last annual GYN exam about 1 year ago, she has not the following changes in her health history: none.     Surgical history confirmed with patient.  has a past surgical history that includes Dilation and curettage of uterus; hysteroscopy; Laparoscopic hysterectomy (2010); myomectomy; and Hysterectomy.    Pap and Mammogram History:    Her most recent Pap smear was normal, obtained 2 year(s) ago.    The patient had her mammogram today in our office.    Breast Cancer History/Substance Abuse: negative      Osteoporosis History:    Family history does not include a first or second degree relative with osteopenia or osteoporosis.    Past Medical History:   Diagnosis Date    CAD (coronary artery disease) 2023    NSTEMI   SCAD of septal branch of LAD    History of abnormal cervical Pap smear 2012    HPV positive    Hypertension      Past Surgical History:   Procedure Laterality Date    DILATION AND CURETTAGE OF UTERUS      HYSTERECTOMY (CERVIX STATUS UNKNOWN)      HYSTEROSCOPY      LAPAROSCOPIC HYSTERECTOMY  2010    laparoscopic supracervical    MYOMECTOMY         Current Outpatient Medications   Medication Sig Dispense Refill    Ascorbic Acid (VITAMIN C PO) Take by mouth      MAGNESIUM PO Take by mouth      CRANBERRY PO Take by mouth      BIOTIN PO Take by

## 2025-05-29 NOTE — PROGRESS NOTES
Bernarda Sims is a 59 y.o. female returns for an annual exam     Chief Complaint   Patient presents with    Annual Exam       No LMP recorded. Patient has had a hysterectomy.    Problems: no problems  Birth Control: status post hysterectomy.  Last Pap: see report obtained 2 year(s) ago.  She does not have a history of DOUG 2, 3 or cervical cancer.   Last Mammogram: had her mammogram today in our office.  It was see report.   Last colonoscopy: normal obtained 5 year(s) ago.      1. Have you been to the ER, urgent care clinic, or hospitalized since your last visit? No    2. Have you seen or consulted any other health care providers outside of the [facility] since your last visit? No    Examination chaperoned by Mery Joseph MA.

## 2025-06-04 ENCOUNTER — RESULTS FOLLOW-UP (OUTPATIENT)
Age: 60
End: 2025-06-04

## 2025-06-04 LAB
CYTOLOGIST CVX/VAG CYTO: NORMAL
CYTOLOGY CVX/VAG DOC CYTO: NORMAL
CYTOLOGY CVX/VAG DOC THIN PREP: NORMAL
DX ICD CODE: NORMAL
HPV GENOTYPE REFLEX: NORMAL
HPV I/H RISK 4 DNA CVX QL PROBE+SIG AMP: NEGATIVE
Lab: NORMAL
OTHER STN SPEC: NORMAL
SERVICE CMNT-IMP: NORMAL
STAT OF ADQ CVX/VAG CYTO-IMP: NORMAL

## 2025-06-25 ENCOUNTER — HOSPITAL ENCOUNTER (OUTPATIENT)
Facility: HOSPITAL | Age: 60
Discharge: HOME | End: 2025-06-28
Payer: COMMERCIAL

## 2025-06-25 ENCOUNTER — RESULTS FOLLOW-UP (OUTPATIENT)
Age: 60
End: 2025-06-25

## 2025-06-25 DIAGNOSIS — R92.8 ABNORMAL MAMMOGRAM OF RIGHT BREAST: ICD-10-CM

## 2025-06-25 PROCEDURE — 76642 ULTRASOUND BREAST LIMITED: CPT

## 2025-06-25 PROCEDURE — G0279 TOMOSYNTHESIS, MAMMO: HCPCS

## 2025-09-02 RX ORDER — METOPROLOL TARTRATE 25 MG/1
12.5 TABLET, FILM COATED ORAL 2 TIMES DAILY
Qty: 90 TABLET | Refills: 3 | Status: SHIPPED | OUTPATIENT
Start: 2025-09-02

## (undated) DEVICE — Device

## (undated) DEVICE — CATHETER DIAG 5FR L100CM LUMN ID0.047IN JL3.5 CRV 0 SIDE H

## (undated) DEVICE — SUPPORT WRST AD W3.5XL9IN DIA14.5IN ART SFT ADJ HK AND LOOP

## (undated) DEVICE — TR BAND RADIAL ARTERY COMPRESSION DEVICE: Brand: TR BAND

## (undated) DEVICE — TUBING PRSS MON L12IN PVC RIG NONEXPANDING M TO FEM CONN

## (undated) DEVICE — CATH 5F 100CM JR40 -- DXTERITY

## (undated) DEVICE — HEART CATH-SFMC: Brand: MEDLINE INDUSTRIES, INC.

## (undated) DEVICE — GUIDEWIRE VASC L180CM DIA0.035IN 3MM PTFE J TIP EXCHG FIX

## (undated) DEVICE — GLIDESHEATH SLENDER STAINLESS STEEL KIT: Brand: GLIDESHEATH SLENDER